# Patient Record
Sex: FEMALE | Race: WHITE | NOT HISPANIC OR LATINO | Employment: FULL TIME | URBAN - METROPOLITAN AREA
[De-identification: names, ages, dates, MRNs, and addresses within clinical notes are randomized per-mention and may not be internally consistent; named-entity substitution may affect disease eponyms.]

---

## 2017-01-09 ENCOUNTER — COMMUNICATION - HEALTHEAST (OUTPATIENT)
Dept: FAMILY MEDICINE | Facility: CLINIC | Age: 47
End: 2017-01-09

## 2017-01-09 DIAGNOSIS — J45.909 ASTHMA: ICD-10-CM

## 2017-01-12 ENCOUNTER — OFFICE VISIT (OUTPATIENT)
Dept: FAMILY MEDICINE | Facility: CLINIC | Age: 47
End: 2017-01-12

## 2017-01-12 VITALS
SYSTOLIC BLOOD PRESSURE: 132 MMHG | TEMPERATURE: 98.4 F | HEART RATE: 85 BPM | HEIGHT: 67 IN | BODY MASS INDEX: 41.47 KG/M2 | OXYGEN SATURATION: 96 % | WEIGHT: 264.2 LBS | RESPIRATION RATE: 20 BRPM | DIASTOLIC BLOOD PRESSURE: 85 MMHG

## 2017-01-12 DIAGNOSIS — J45.41 MODERATE PERSISTENT ASTHMA WITH ACUTE EXACERBATION: Primary | ICD-10-CM

## 2017-01-12 DIAGNOSIS — J20.9 ACUTE BRONCHITIS, UNSPECIFIED ORGANISM: ICD-10-CM

## 2017-01-12 RX ORDER — ALBUTEROL SULFATE 90 UG/1
2 AEROSOL, METERED RESPIRATORY (INHALATION) EVERY 6 HOURS PRN
Qty: 3 INHALER | Refills: 1 | Status: SHIPPED
Start: 2017-01-12 | End: 2021-08-24

## 2017-01-12 RX ORDER — PREDNISONE 20 MG/1
20 TABLET ORAL DAILY
Qty: 5 TABLET | Refills: 0 | Status: SHIPPED
Start: 2017-01-12 | End: 2021-08-31

## 2017-01-12 NOTE — PATIENT INSTRUCTIONS
Please take your prednisone.    If you develop fevers or worsening SOB without relief from neb/inhaler, please come back or go to ED.     Please come back in two weeks to reassess asthma control.       My Asthma Action Plan  Name: Annabella Guillen  YOB: 1970  Date: 1/12/2017   My doctor: Estelle Kearns   My clinic:   [facility] [address] [place] [address]  [phone]    My Asthma Severity: moderate persistent Avoid your asthma triggers: upper respiratory infections, pollens, animal dander and exercise or sports      GREEN ZONE   Good Control    I feel good    No cough or wheeze    Can work, sleep and play without asthma symptoms       Take your asthma control medicine every day.  Take the medications listed below daily.    Symbicort  160/4.5 mcg 2 puffs twice a day    1. If exercise triggers your asthma, take your rescue medication (2 puffs of albuterol, Ventolin/Pro-Air) 15 minutes before exercise or sports, and during exercise if you have asthma symptoms.  2. Spacer to use with inhaler: If you have a spacer, make sure to use it with your inhaler.              YELLOW ZONE Getting Worse  I have ANY of these:    I do not feel good    Cough or wheeze    Chest feels tight    Wake up at night   1. Keep taking your Green Zone medications.  2. Start taking your rescue medicine (1-2 puffs of albuterol - Ventolin/Pro-Air) every 4-6 hours as needed.  3. If symptoms are not controlled with above, can take 2 puffs every 20 minutes for up to 1 hour, then continue every 4 hours if needed.   4. If you do not return to the Green Zone in 12-24 hours or you get worse, call the clinic.         RED ZONE Medical Alert - Get Help  I have ANY of these:    I feel awful    Medicine is not helping    Breathing getting harder    Trouble walking or talking    Nose opens wide to breathe       1. Take your rescue medicine NOW (6-8 puffs of albuterol - Ventolin/Pro-Air) for every 20 minutes for up to  1 hour.  2. Call your doctor NOW.  3. If you are still in the Red Zone after 20 minutes and you have not reached your doctor:    Take your rescue medicine again (6-8 puffs of albuterol - Ventolin/Pro-Air) and    Call 911 or go to the emergency room right away    See your regular doctor within 1 weeks of an Emergency Room or Urgent Care visit for follow-up treatment.            Electronically signed by: Estelle Kearns    Annual Reminders:  Meet with Asthma Educator,  Flu Shot in the Fall, Pneumonia Shot  [id number] [place], [place] - [address] AT [facility] [address]

## 2017-01-12 NOTE — PROGRESS NOTES
"       HPI:       Annabella Guillen is a 46 year old  female with a significant past medical history of asthma who presents for follow up concern of URI.    Recently had sinus infection and given abx 12/28/16.   Finished abx Jan 4   Felt better for five days, not 100%- but face pain, ear pain better.    Then on Sat (1/7/17) she began to have sore throat, cough again   Food tastes strange-almost metallic, salt burns her throat.    Really thick, tan mucous phlegm   She has had a couple of episodes of post tussive emesis, as well as slight incontinence  Myalgias.   No fever. Slight short of breath, but relieved with nebs/inhaler.   ACT 19 today    Had abx for BV shortly before course of cefdinir- she is wondering if she has thrush.  Was on airplane and cruise ship. Also 5/9 family members on her trip had URI symptoms.         PMHX:     Patient Active Problem List   Diagnosis     Atopic rhinitis     Morbid obesity due to excess calories (H)     Asthma     Adjustment disorder with anxious mood       Current Outpatient Prescriptions   Medication Sig Dispense Refill     albuterol (ALBUTEROL) 108 (90 BASE) MCG/ACT Inhaler 2 puffs       budesonide-formoterol (SYMBICORT) 160-4.5 MCG/ACT Inhaler Inhale 2 puffs into the lungs       cetirizine HCl (ZYRTEC ALLERGY) 10 MG CAPS Take 10 mg by mouth daily       cefdinir (OMNICEF) 300 MG capsule Take 1 capsule (300 mg) by mouth daily 10 capsule 0     omeprazole (PRILOSEC) 40 MG capsule Take 40 mg by mouth daily          Allergies   Allergen Reactions     Amoxicillin-Pot Clavulanate      Augmentin     Penicillins Nausea and Vomiting            Review of Systems:   As above          Physical Exam:     Filed Vitals:    01/12/17 1109   BP: 132/85   Pulse: 85   Temp: 98.4  F (36.9  C)   TempSrc: Oral   Resp: 20   Height: 5' 6.5\" (168.9 cm)   Weight: 264 lb 3.2 oz (119.84 kg)   SpO2: 96%     Body mass index is 42.01 kg/(m^2).    Vitals reviewed and normal.  GENERAL APPEARANCE: healthy, alert " and no distress  EYES: Eyes grossly normal to inspection, PERRL and conjunctivae and sclerae normal  HENT: ear canals and TM's normal, nose and mouth without ulcers or lesions, oropharynx clear and oral mucous membranes moist. Tonsils wnl. PND evident. No thrush.   NECK: no adenopathy, no asymmetry, masses, or scars and thyroid normal to palpation  RESP: Diffuse wheezing. No crackles. No tachypnea or increased WOB.   CV: regular rates and rhythm, normal S1 S2, no S3 or S4, no murmur, click or rub, no peripheral edema and peripheral pulses 2+  PSYCH: mentation appears normal and affect normal/bright    Assessment and Plan     (J45.41) Moderate persistent asthma with acute exacerbation  (primary encounter diagnosis)- Asthma seems exacerbated by viral URI.   Plan: predniSONE (DELTASONE) 20 MG tablet x 5 days,   albuterol  108 (90 BASE) MCG/ACT Inhaler PRN,   Asthma Action Plan   RTC if not improved within one week.   RTC vs ER if fever or SOB develops.           Options for treatment and follow-up care were reviewed with the patient and/or guardian. Annabella Guillen and/or guardian engaged in the decision making process and verbalized understanding of the options discussed and agreed with the final plan.    Estelle Kearns MD      Precepted today with: Caryn Crowell MD

## 2017-01-12 NOTE — MR AVS SNAPSHOT
After Visit Summary   1/12/2017    Annabella Guillen    MRN: [id number]           Patient Information     Date Of Birth          1970        Visit Information        Provider Department      1/12/2017 11:00 AM Estelle Kearns MD [facility]        Today's Diagnoses     Moderate persistent asthma with acute exacerbation    -  1       Care Instructions    Please take your prednisone.    If you develop fevers or worsening SOB without relief from neb/inhaler, please come back or go to ED.     Please come back in two weeks to reassess asthma control.       My Asthma Action Plan  Name: Annabella Guillen  YOB: 1970  Date: 1/12/2017   My doctor: Estelle Kearns   My clinic:   [facility] [address] [place] [address]  [phone]    My Asthma Severity: moderate persistent Avoid your asthma triggers: upper respiratory infections, pollens, animal dander and exercise or sports      GREEN ZONE   Good Control    I feel good    No cough or wheeze    Can work, sleep and play without asthma symptoms       Take your asthma control medicine every day.  Take the medications listed below daily.    Symbicort  160/4.5 mcg 2 puffs twice a day    1. If exercise triggers your asthma, take your rescue medication (2 puffs of albuterol, Ventolin/Pro-Air) 15 minutes before exercise or sports, and during exercise if you have asthma symptoms.  2. Spacer to use with inhaler: If you have a spacer, make sure to use it with your inhaler.              YELLOW ZONE Getting Worse  I have ANY of these:    I do not feel good    Cough or wheeze    Chest feels tight    Wake up at night   1. Keep taking your Green Zone medications.  2. Start taking your rescue medicine (1-2 puffs of albuterol - Ventolin/Pro-Air) every 4-6 hours as needed.  3. If symptoms are not controlled with above, can take 2 puffs every 20 minutes for up to 1 hour, then continue every 4 hours if needed.   4. If you  do not return to the Green Zone in 12-24 hours or you get worse, call the clinic.         RED ZONE Medical Alert - Get Help  I have ANY of these:    I feel awful    Medicine is not helping    Breathing getting harder    Trouble walking or talking    Nose opens wide to breathe       1. Take your rescue medicine NOW (6-8 puffs of albuterol - Ventolin/Pro-Air) for every 20 minutes for up to 1 hour.  2. Call your doctor NOW.  3. If you are still in the Red Zone after 20 minutes and you have not reached your doctor:    Take your rescue medicine again (6-8 puffs of albuterol - Ventolin/Pro-Air) and    Call 911 or go to the emergency room right away    See your regular doctor within 1 weeks of an Emergency Room or Urgent Care visit for follow-up treatment.            Electronically signed by: Estelle Kearns    Annual Reminders:  Meet with Asthma Educator,  Flu Shot in the Fall, Pneumonia Shot  Pharmacy: [facility] [place]) [place] [address] AT [facility] OF [address]        Follow-ups after your visit        Who to contact     Please call your clinic at [phone] to:    Ask questions about your health    Make or cancel appointments    Discuss your medicines    Learn about your test results    Speak to your doctor   If you have compliments or concerns about an experience at your clinic, or if you wish to file a complaint, please contact [facility] Patient Relations at [phone] or email us at [email or web address]         Additional Information About Your Visit        Scope [employer] Information     [facility] is an electronic gateway that provides easy, online access to your medical records. With [facility], you can request a clinic appointment, read your test results, renew a prescription or communicate with your care team.     To sign up for [facility] visit the website at [email or web address]   You will be asked to enter the access code listed  "below, as well as some personal information. Please follow the directions to create your username and password.     Your access code is: [email or web address]  Expires: 3/28/2017 10:18 AM     Your access code will  in 90 days. If you need help or a new code, please contact your [facility] or call [phone] for assistance.        Care EveryWhere ID     This is your Care EveryWhere ID. This could be used by other organizations to access your [place] medical records  [id number]        Your Vitals Were     Pulse Temperature Respirations Height BMI (Body Mass Index) Pulse Oximetry    85 98.4  F (36.9  C) (Oral) 20 5' 6.5\" (168.9 cm) 42.01 kg/m2 96%       Blood Pressure from Last 3 Encounters:   17 132/85   16 136/82    Weight from Last 3 Encounters:   17 264 lb 3.2 oz (119.84 kg)   16 264 lb (119.75 kg)              We Performed the Following     Asthma Action Plan (Please complete AAP by clicking Edit below in Patient Instructions section.)          Today's Medication Changes          These changes are accurate as of: 17 11:47 AM.  If you have any questions, ask your nurse or doctor.               Start taking these medicines.        Dose/Directions    predniSONE 20 MG tablet   Commonly known as:  DELTASONE   Used for:  Moderate persistent asthma with acute exacerbation   Started by:  Estelle Kearns MD        Dose:  20 mg   Take 1 tablet (20 mg) by mouth daily   Quantity:  5 tablet   Refills:  0         These medicines have changed or have updated prescriptions.        Dose/Directions    * albuterol 108 (90 BASE) MCG/ACT Inhaler   This may have changed:  Another medication with the same name was added. Make sure you understand how and when to take each.   Generic drug:  albuterol   Changed by:  Estelle Kearns MD        Dose:  2 puff   2 puffs   Refills:  0       * albuterol 108 (90 BASE) MCG/ACT Inhaler   Commonly known as:  PROAIR HFA/PROVENTIL " HFA/VENTOLIN HFA   This may have changed:  You were already taking a medication with the same name, and this prescription was added. Make sure you understand how and when to take each.   Used for:  Moderate persistent asthma with acute exacerbation   Changed by:  Estelle Kearns MD        Dose:  2 puff   Inhale 2 puffs into the lungs every 6 hours as needed for shortness of breath / dyspnea or wheezing   Quantity:  3 Inhaler   Refills:  1       * Notice:  This list has 2 medication(s) that are the same as other medications prescribed for you. Read the directions carefully, and ask your doctor or other care provider to review them with you.         Where to get your medicines      These medications were sent to [facility] ([place]) - [place], [place] - [address] AT [address] [facility]  [address], [facility] [address]     Phone:  [phone]    - albuterol 108 (90 BASE) MCG/ACT Inhaler  - predniSONE 20 MG tablet             Primary Care Provider Office Phone # Fax #    Estelle Kearns [phone]       [facility] [address] [place] [address]        Thank you!     Thank you for choosing [facility]  for your care. Our goal is always to provide you with excellent care. Hearing back from our patients is one way we can continue to improve our services. Please take a few minutes to complete the written survey that you may receive in the mail after your visit with us. Thank you!             Your Updated Medication List - Protect others around you: Learn how to safely use, store and throw away your medicines at www.disposemymeds.org.          This list is accurate as of: 1/12/17 11:47 AM.  Always use your most recent med list.                   Brand Name Dispense Instructions for use    * albuterol 108 (90 BASE) MCG/ACT Inhaler   Generic drug:  albuterol      2 puffs       * albuterol 108 (90 BASE) MCG/ACT Inhaler     PROAIR HFA/PROVENTIL HFA/VENTOLIN HFA    3 Inhaler    Inhale 2 puffs into the lungs every 6 hours as needed for shortness of breath / dyspnea or wheezing       cefdinir 300 MG capsule    OMNICEF    10 capsule    Take 1 capsule (300 mg) by mouth daily       omeprazole 40 MG capsule    priLOSEC     Take 40 mg by mouth daily       predniSONE 20 MG tablet    DELTASONE    5 tablet    Take 1 tablet (20 mg) by mouth daily       SYMBICORT 160-4.5 MCG/ACT Inhaler   Generic drug:  budesonide-formoterol      Inhale 2 puffs into the lungs       ZYRTEC ALLERGY 10 MG Caps   Generic drug:  cetirizine HCl      Take 10 mg by mouth daily       * Notice:  This list has 2 medication(s) that are the same as other medications prescribed for you. Read the directions carefully, and ask your doctor or other care provider to review them with you.

## 2017-01-12 NOTE — PROGRESS NOTES
Preceptor Attestation:  Patient's case reviewed and discussed with Estelle Kearns MD resident and I evaluated the patient. I agree with written assessment and plan of care.  Supervising Physician:  Caryn Crowell MD  [facility]

## 2017-01-19 ENCOUNTER — RECORDS - HEALTHEAST (OUTPATIENT)
Dept: ADMINISTRATIVE | Facility: OTHER | Age: 47
End: 2017-01-19

## 2017-01-20 ENCOUNTER — RECORDS - HEALTHEAST (OUTPATIENT)
Dept: ADMINISTRATIVE | Facility: OTHER | Age: 47
End: 2017-01-20

## 2017-01-20 ASSESSMENT — ASTHMA QUESTIONNAIRES: ACT_TOTALSCORE: 12

## 2017-09-22 ENCOUNTER — TELEPHONE (OUTPATIENT)
Dept: FAMILY MEDICINE | Facility: CLINIC | Age: 47
End: 2017-09-22

## 2017-09-22 NOTE — TELEPHONE ENCOUNTER
Called but no answer. Left VM for pt to call back to go over ACT. If score below 19 will schedule asthma follow up. --Thang, CMA

## 2017-11-10 ENCOUNTER — OFFICE VISIT - HEALTHEAST (OUTPATIENT)
Dept: FAMILY MEDICINE | Facility: CLINIC | Age: 47
End: 2017-11-10

## 2017-11-10 DIAGNOSIS — Z12.31 ENCOUNTER FOR SCREENING MAMMOGRAM FOR BREAST CANCER: ICD-10-CM

## 2017-11-10 DIAGNOSIS — J45.909 ASTHMA: ICD-10-CM

## 2017-11-10 ASSESSMENT — MIFFLIN-ST. JEOR: SCORE: 1741.28

## 2017-12-11 ENCOUNTER — HOSPITAL ENCOUNTER (OUTPATIENT)
Dept: MAMMOGRAPHY | Facility: CLINIC | Age: 47
Discharge: HOME | End: 2017-12-11
Attending: FAMILY MEDICINE

## 2017-12-11 DIAGNOSIS — Z12.31 ENCOUNTER FOR SCREENING MAMMOGRAM FOR BREAST CANCER: ICD-10-CM

## 2017-12-15 ENCOUNTER — COMMUNICATION - HEALTHEAST (OUTPATIENT)
Dept: FAMILY MEDICINE | Facility: CLINIC | Age: 47
End: 2017-12-15

## 2017-12-15 ENCOUNTER — RECORDS - HEALTHEAST (OUTPATIENT)
Dept: ADMINISTRATIVE | Facility: OTHER | Age: 47
End: 2017-12-15

## 2017-12-30 ENCOUNTER — COMMUNICATION - HEALTHEAST (OUTPATIENT)
Dept: FAMILY MEDICINE | Facility: CLINIC | Age: 47
End: 2017-12-30

## 2018-01-07 ENCOUNTER — HEALTH MAINTENANCE LETTER (OUTPATIENT)
Age: 48
End: 2018-01-07

## 2018-04-06 ENCOUNTER — COMMUNICATION - HEALTHEAST (OUTPATIENT)
Dept: SCHEDULING | Facility: CLINIC | Age: 48
End: 2018-04-06

## 2018-04-09 ENCOUNTER — OFFICE VISIT - HEALTHEAST (OUTPATIENT)
Dept: CARDIOLOGY | Facility: CLINIC | Age: 48
End: 2018-04-09

## 2018-04-09 DIAGNOSIS — R07.9 CHEST PAIN, UNSPECIFIED TYPE: ICD-10-CM

## 2018-04-09 DIAGNOSIS — R00.2 PALPITATIONS: ICD-10-CM

## 2018-04-09 DIAGNOSIS — R55 PRE-SYNCOPE: ICD-10-CM

## 2018-04-09 ASSESSMENT — MIFFLIN-ST. JEOR: SCORE: 1762.6

## 2018-07-24 ENCOUNTER — OFFICE VISIT - HEALTHEAST (OUTPATIENT)
Dept: FAMILY MEDICINE | Facility: CLINIC | Age: 48
End: 2018-07-24

## 2018-07-24 DIAGNOSIS — J01.90 ACUTE SINUSITIS: ICD-10-CM

## 2018-07-24 ASSESSMENT — MIFFLIN-ST. JEOR: SCORE: 1807.96

## 2018-09-05 ENCOUNTER — COMMUNICATION - HEALTHEAST (OUTPATIENT)
Dept: FAMILY MEDICINE | Facility: CLINIC | Age: 48
End: 2018-09-05

## 2018-11-23 ENCOUNTER — COMMUNICATION - HEALTHEAST (OUTPATIENT)
Dept: FAMILY MEDICINE | Facility: CLINIC | Age: 48
End: 2018-11-23

## 2018-11-23 DIAGNOSIS — J45.909 ASTHMA: ICD-10-CM

## 2018-12-05 ENCOUNTER — COMMUNICATION - HEALTHEAST (OUTPATIENT)
Dept: FAMILY MEDICINE | Facility: CLINIC | Age: 48
End: 2018-12-05

## 2019-07-31 ENCOUNTER — SURGERY - HEALTHEAST (OUTPATIENT)
Dept: PODIATRY | Facility: CLINIC | Age: 49
End: 2019-07-31

## 2019-07-31 ENCOUNTER — RECORDS - HEALTHEAST (OUTPATIENT)
Dept: GENERAL RADIOLOGY | Facility: CLINIC | Age: 49
End: 2019-07-31

## 2019-07-31 ENCOUNTER — OFFICE VISIT - HEALTHEAST (OUTPATIENT)
Dept: PODIATRY | Facility: CLINIC | Age: 49
End: 2019-07-31

## 2019-07-31 DIAGNOSIS — M20.10 HALLUX VALGUS, UNSPECIFIED LATERALITY: ICD-10-CM

## 2019-07-31 DIAGNOSIS — M20.10 HALLUX VALGUS (ACQUIRED), UNSPECIFIED FOOT: ICD-10-CM

## 2019-07-31 DIAGNOSIS — M21.6X2 PRONATION DEFORMITY OF BOTH FEET: ICD-10-CM

## 2019-07-31 DIAGNOSIS — M21.6X1 PRONATION DEFORMITY OF BOTH FEET: ICD-10-CM

## 2019-07-31 ASSESSMENT — MIFFLIN-ST. JEOR: SCORE: 1807.96

## 2019-08-02 ENCOUNTER — COMMUNICATION - HEALTHEAST (OUTPATIENT)
Dept: VASCULAR SURGERY | Facility: CLINIC | Age: 49
End: 2019-08-02

## 2019-08-08 ENCOUNTER — COMMUNICATION - HEALTHEAST (OUTPATIENT)
Dept: OTHER | Facility: CLINIC | Age: 49
End: 2019-08-08

## 2019-08-13 ENCOUNTER — COMMUNICATION - HEALTHEAST (OUTPATIENT)
Dept: OTHER | Facility: CLINIC | Age: 49
End: 2019-08-13

## 2019-10-27 ENCOUNTER — COMMUNICATION - HEALTHEAST (OUTPATIENT)
Dept: FAMILY MEDICINE | Facility: CLINIC | Age: 49
End: 2019-10-27

## 2019-10-27 DIAGNOSIS — J45.909 ASTHMA: ICD-10-CM

## 2020-01-04 ENCOUNTER — COMMUNICATION - HEALTHEAST (OUTPATIENT)
Dept: FAMILY MEDICINE | Facility: CLINIC | Age: 50
End: 2020-01-04

## 2020-01-04 DIAGNOSIS — J45.909 ASTHMA: ICD-10-CM

## 2020-03-13 ENCOUNTER — COMMUNICATION - HEALTHEAST (OUTPATIENT)
Dept: FAMILY MEDICINE | Facility: CLINIC | Age: 50
End: 2020-03-13

## 2020-03-13 DIAGNOSIS — J45.30 MILD PERSISTENT ASTHMA WITHOUT COMPLICATION: ICD-10-CM

## 2020-03-18 ENCOUNTER — OFFICE VISIT - HEALTHEAST (OUTPATIENT)
Dept: FAMILY MEDICINE | Facility: CLINIC | Age: 50
End: 2020-03-18

## 2020-03-18 DIAGNOSIS — J45.909 ASTHMA: ICD-10-CM

## 2020-03-18 DIAGNOSIS — J30.9 ALLERGIC RHINITIS, UNSPECIFIED SEASONALITY, UNSPECIFIED TRIGGER: ICD-10-CM

## 2020-04-03 ENCOUNTER — COMMUNICATION - HEALTHEAST (OUTPATIENT)
Dept: FAMILY MEDICINE | Facility: CLINIC | Age: 50
End: 2020-04-03

## 2020-04-16 ENCOUNTER — COMMUNICATION - HEALTHEAST (OUTPATIENT)
Dept: FAMILY MEDICINE | Facility: CLINIC | Age: 50
End: 2020-04-16

## 2020-05-05 ENCOUNTER — COMMUNICATION - HEALTHEAST (OUTPATIENT)
Dept: FAMILY MEDICINE | Facility: CLINIC | Age: 50
End: 2020-05-05

## 2020-06-11 ENCOUNTER — COMMUNICATION - HEALTHEAST (OUTPATIENT)
Dept: FAMILY MEDICINE | Facility: CLINIC | Age: 50
End: 2020-06-11

## 2020-06-11 DIAGNOSIS — J45.30 MILD PERSISTENT ASTHMA WITHOUT COMPLICATION: ICD-10-CM

## 2020-07-15 ENCOUNTER — COMMUNICATION - HEALTHEAST (OUTPATIENT)
Dept: FAMILY MEDICINE | Facility: CLINIC | Age: 50
End: 2020-07-15

## 2020-07-16 ENCOUNTER — OFFICE VISIT - HEALTHEAST (OUTPATIENT)
Dept: FAMILY MEDICINE | Facility: CLINIC | Age: 50
End: 2020-07-16

## 2020-07-16 ENCOUNTER — COMMUNICATION - HEALTHEAST (OUTPATIENT)
Dept: FAMILY MEDICINE | Facility: CLINIC | Age: 50
End: 2020-07-16

## 2020-07-16 DIAGNOSIS — Z12.31 ENCOUNTER FOR SCREENING MAMMOGRAM FOR BREAST CANCER: ICD-10-CM

## 2020-07-16 DIAGNOSIS — J45.909 ASTHMA: ICD-10-CM

## 2020-07-16 DIAGNOSIS — E66.01 MORBID OBESITY (H): ICD-10-CM

## 2020-07-16 DIAGNOSIS — J45.30 MILD PERSISTENT ASTHMA WITHOUT COMPLICATION: ICD-10-CM

## 2020-07-16 DIAGNOSIS — Z00.00 ROUTINE GENERAL MEDICAL EXAMINATION AT A HEALTH CARE FACILITY: ICD-10-CM

## 2020-07-16 LAB
ALBUMIN SERPL-MCNC: 4 G/DL (ref 3.5–5)
ALP SERPL-CCNC: 91 U/L (ref 45–120)
ALT SERPL W P-5'-P-CCNC: 43 U/L (ref 0–45)
ANION GAP SERPL CALCULATED.3IONS-SCNC: 10 MMOL/L (ref 5–18)
AST SERPL W P-5'-P-CCNC: 30 U/L (ref 0–40)
BILIRUB SERPL-MCNC: 0.7 MG/DL (ref 0–1)
BUN SERPL-MCNC: 15 MG/DL (ref 8–22)
CALCIUM SERPL-MCNC: 9.8 MG/DL (ref 8.5–10.5)
CHLORIDE BLD-SCNC: 104 MMOL/L (ref 98–107)
CHOLEST SERPL-MCNC: 207 MG/DL
CO2 SERPL-SCNC: 26 MMOL/L (ref 22–31)
CREAT SERPL-MCNC: 0.76 MG/DL (ref 0.6–1.1)
FASTING STATUS PATIENT QL REPORTED: YES
GFR SERPL CREATININE-BSD FRML MDRD: >60 ML/MIN/1.73M2
GLUCOSE BLD-MCNC: 106 MG/DL (ref 70–125)
HBA1C MFR BLD: 5.6 % (ref 3.5–6)
HDLC SERPL-MCNC: 58 MG/DL
LDLC SERPL CALC-MCNC: 137 MG/DL
POTASSIUM BLD-SCNC: 4.6 MMOL/L (ref 3.5–5)
PROT SERPL-MCNC: 7.1 G/DL (ref 6–8)
SODIUM SERPL-SCNC: 140 MMOL/L (ref 136–145)
TRIGL SERPL-MCNC: 61 MG/DL
TSH SERPL DL<=0.005 MIU/L-ACNC: 2.85 UIU/ML (ref 0.3–5)

## 2020-07-16 ASSESSMENT — MIFFLIN-ST. JEOR: SCORE: 1822.93

## 2020-07-17 LAB
HPV SOURCE: NORMAL
HUMAN PAPILLOMA VIRUS 16 DNA: NEGATIVE
HUMAN PAPILLOMA VIRUS 18 DNA: NEGATIVE
HUMAN PAPILLOMA VIRUS FINAL DIAGNOSIS: NORMAL
HUMAN PAPILLOMA VIRUS OTHER HR: NEGATIVE
SPECIMEN DESCRIPTION: NORMAL

## 2020-07-23 LAB
BKR LAB AP ABNORMAL BLEEDING: NO
BKR LAB AP BIRTH CONTROL/HORMONES: NORMAL
BKR LAB AP CERVICAL APPEARANCE: NORMAL
BKR LAB AP GYN ADEQUACY: NORMAL
BKR LAB AP GYN INTERPRETATION: NORMAL
BKR LAB AP HPV REFLEX: NORMAL
BKR LAB AP LMP: NORMAL
BKR LAB AP PATIENT STATUS: NORMAL
BKR LAB AP PREVIOUS ABNORMAL: NORMAL
BKR LAB AP PREVIOUS NORMAL: 2014
HIGH RISK?: NO
PATH REPORT.COMMENTS IMP SPEC: NORMAL
RESULT FLAG (HE HISTORICAL CONVERSION): NORMAL

## 2020-07-24 ENCOUNTER — COMMUNICATION - HEALTHEAST (OUTPATIENT)
Dept: FAMILY MEDICINE | Facility: CLINIC | Age: 50
End: 2020-07-24

## 2020-07-27 ENCOUNTER — COMMUNICATION - HEALTHEAST (OUTPATIENT)
Dept: SURGERY | Facility: CLINIC | Age: 50
End: 2020-07-27

## 2020-08-03 ENCOUNTER — OFFICE VISIT - HEALTHEAST (OUTPATIENT)
Dept: SURGERY | Facility: CLINIC | Age: 50
End: 2020-08-03

## 2020-08-03 DIAGNOSIS — E66.9 OBESITY: ICD-10-CM

## 2020-08-17 ENCOUNTER — OFFICE VISIT - HEALTHEAST (OUTPATIENT)
Dept: SURGERY | Facility: CLINIC | Age: 50
End: 2020-08-17

## 2020-08-17 DIAGNOSIS — E66.9 OBESITY: ICD-10-CM

## 2020-09-09 ENCOUNTER — OFFICE VISIT - HEALTHEAST (OUTPATIENT)
Dept: FAMILY MEDICINE | Facility: CLINIC | Age: 50
End: 2020-09-09

## 2020-09-09 ENCOUNTER — HOSPITAL ENCOUNTER (OUTPATIENT)
Dept: MAMMOGRAPHY | Facility: CLINIC | Age: 50
Discharge: HOME | End: 2020-09-09
Attending: FAMILY MEDICINE

## 2020-09-09 DIAGNOSIS — J45.30 MILD PERSISTENT ASTHMA WITHOUT COMPLICATION: ICD-10-CM

## 2020-09-09 DIAGNOSIS — S86.811A STRAIN OF CALF MUSCLE, RIGHT, INITIAL ENCOUNTER: ICD-10-CM

## 2020-09-09 DIAGNOSIS — Z23 NEED FOR VACCINATION: ICD-10-CM

## 2020-09-09 DIAGNOSIS — Z12.31 ENCOUNTER FOR SCREENING MAMMOGRAM FOR BREAST CANCER: ICD-10-CM

## 2020-09-09 ASSESSMENT — MIFFLIN-ST. JEOR: SCORE: 1848.79

## 2020-09-18 ENCOUNTER — HOSPITAL ENCOUNTER (OUTPATIENT)
Dept: ULTRASOUND IMAGING | Facility: CLINIC | Age: 50
Discharge: HOME | End: 2020-09-18
Attending: FAMILY MEDICINE

## 2020-09-18 ENCOUNTER — HOSPITAL ENCOUNTER (OUTPATIENT)
Dept: MAMMOGRAPHY | Facility: CLINIC | Age: 50
Discharge: HOME | End: 2020-09-18
Attending: FAMILY MEDICINE

## 2020-09-18 DIAGNOSIS — N64.89 BREAST ASYMMETRY: ICD-10-CM

## 2020-09-21 ENCOUNTER — OFFICE VISIT - HEALTHEAST (OUTPATIENT)
Dept: FAMILY MEDICINE | Facility: CLINIC | Age: 50
End: 2020-09-21

## 2020-09-21 DIAGNOSIS — J45.41 MODERATE PERSISTENT ASTHMA WITH ACUTE EXACERBATION: ICD-10-CM

## 2020-09-28 ENCOUNTER — COMMUNICATION - HEALTHEAST (OUTPATIENT)
Dept: FAMILY MEDICINE | Facility: CLINIC | Age: 50
End: 2020-09-28

## 2020-09-29 ENCOUNTER — OFFICE VISIT - HEALTHEAST (OUTPATIENT)
Dept: FAMILY MEDICINE | Facility: CLINIC | Age: 50
End: 2020-09-29

## 2020-09-29 DIAGNOSIS — B02.9 HERPES ZOSTER WITHOUT COMPLICATION: ICD-10-CM

## 2020-09-29 DIAGNOSIS — Z20.822 EXPOSURE TO COVID-19 VIRUS: ICD-10-CM

## 2020-10-27 ENCOUNTER — COMMUNICATION - HEALTHEAST (OUTPATIENT)
Dept: FAMILY MEDICINE | Facility: CLINIC | Age: 50
End: 2020-10-27

## 2020-10-27 DIAGNOSIS — J45.909 ASTHMA: ICD-10-CM

## 2020-12-04 ENCOUNTER — COMMUNICATION - HEALTHEAST (OUTPATIENT)
Dept: FAMILY MEDICINE | Facility: CLINIC | Age: 50
End: 2020-12-04

## 2020-12-04 DIAGNOSIS — J45.30 MILD PERSISTENT ASTHMA WITHOUT COMPLICATION: ICD-10-CM

## 2020-12-08 ENCOUNTER — COMMUNICATION - HEALTHEAST (OUTPATIENT)
Dept: FAMILY MEDICINE | Facility: CLINIC | Age: 50
End: 2020-12-08

## 2021-01-01 ENCOUNTER — COMMUNICATION - HEALTHEAST (OUTPATIENT)
Dept: FAMILY MEDICINE | Facility: CLINIC | Age: 51
End: 2021-01-01

## 2021-01-04 ENCOUNTER — COMMUNICATION - HEALTHEAST (OUTPATIENT)
Dept: FAMILY MEDICINE | Facility: CLINIC | Age: 51
End: 2021-01-04

## 2021-01-11 ENCOUNTER — OFFICE VISIT - HEALTHEAST (OUTPATIENT)
Dept: FAMILY MEDICINE | Facility: CLINIC | Age: 51
End: 2021-01-11

## 2021-01-11 DIAGNOSIS — R06.83 SNORING: ICD-10-CM

## 2021-01-11 DIAGNOSIS — F41.1 GAD (GENERALIZED ANXIETY DISORDER): ICD-10-CM

## 2021-01-11 DIAGNOSIS — E66.01 MORBID OBESITY (H): ICD-10-CM

## 2021-01-11 ASSESSMENT — MIFFLIN-ST. JEOR: SCORE: 1909.57

## 2021-01-26 ENCOUNTER — COMMUNICATION - HEALTHEAST (OUTPATIENT)
Dept: FAMILY MEDICINE | Facility: CLINIC | Age: 51
End: 2021-01-26

## 2021-02-10 ENCOUNTER — OFFICE VISIT - HEALTHEAST (OUTPATIENT)
Dept: FAMILY MEDICINE | Facility: CLINIC | Age: 51
End: 2021-02-10

## 2021-02-10 DIAGNOSIS — E66.01 MORBID OBESITY (H): ICD-10-CM

## 2021-02-10 DIAGNOSIS — F43.9 STRESS: ICD-10-CM

## 2021-02-10 DIAGNOSIS — F41.1 GAD (GENERALIZED ANXIETY DISORDER): ICD-10-CM

## 2021-03-11 ENCOUNTER — COMMUNICATION - HEALTHEAST (OUTPATIENT)
Dept: FAMILY MEDICINE | Facility: CLINIC | Age: 51
End: 2021-03-11

## 2021-03-11 ENCOUNTER — OFFICE VISIT - HEALTHEAST (OUTPATIENT)
Dept: FAMILY MEDICINE | Facility: CLINIC | Age: 51
End: 2021-03-11

## 2021-03-11 DIAGNOSIS — I10 HYPERTENSION, UNSPECIFIED TYPE: ICD-10-CM

## 2021-03-11 DIAGNOSIS — R60.0 PERIPHERAL EDEMA: ICD-10-CM

## 2021-03-11 LAB
ALBUMIN SERPL-MCNC: 4.1 G/DL (ref 3.5–5)
ALP SERPL-CCNC: 99 U/L (ref 45–120)
ALT SERPL W P-5'-P-CCNC: 45 U/L (ref 0–45)
ANION GAP SERPL CALCULATED.3IONS-SCNC: 10 MMOL/L (ref 5–18)
AST SERPL W P-5'-P-CCNC: 29 U/L (ref 0–40)
BILIRUB SERPL-MCNC: 0.4 MG/DL (ref 0–1)
BNP SERPL-MCNC: 18 PG/ML (ref 0–69)
BUN SERPL-MCNC: 13 MG/DL (ref 8–22)
CALCIUM SERPL-MCNC: 9.6 MG/DL (ref 8.5–10.5)
CHLORIDE BLD-SCNC: 105 MMOL/L (ref 98–107)
CO2 SERPL-SCNC: 27 MMOL/L (ref 22–31)
CREAT SERPL-MCNC: 0.75 MG/DL (ref 0.6–1.1)
GFR SERPL CREATININE-BSD FRML MDRD: >60 ML/MIN/1.73M2
GLUCOSE BLD-MCNC: 93 MG/DL (ref 70–125)
POTASSIUM BLD-SCNC: 4.1 MMOL/L (ref 3.5–5)
PROT SERPL-MCNC: 7.6 G/DL (ref 6–8)
SODIUM SERPL-SCNC: 142 MMOL/L (ref 136–145)

## 2021-03-29 ENCOUNTER — COMMUNICATION - HEALTHEAST (OUTPATIENT)
Dept: FAMILY MEDICINE | Facility: CLINIC | Age: 51
End: 2021-03-29

## 2021-03-29 DIAGNOSIS — J45.909 ASTHMA: ICD-10-CM

## 2021-05-03 ENCOUNTER — OFFICE VISIT - HEALTHEAST (OUTPATIENT)
Dept: FAMILY MEDICINE | Facility: CLINIC | Age: 51
End: 2021-05-03

## 2021-05-03 DIAGNOSIS — I10 ESSENTIAL HYPERTENSION, BENIGN: ICD-10-CM

## 2021-05-28 ASSESSMENT — ASTHMA QUESTIONNAIRES
ACT_TOTALSCORE: 13
ACT_TOTALSCORE: 20
ACT_TOTALSCORE: 19
ACT_TOTALSCORE: 20

## 2021-05-31 VITALS — WEIGHT: 237.8 LBS | BODY MASS INDEX: 37.32 KG/M2 | HEIGHT: 67 IN

## 2021-05-31 NOTE — PATIENT INSTRUCTIONS - HE
Surgery Information    Surgery Date Naoim will call you     ( Arrive at the hospital one and a half hours prior to your surgery and 1 hour prior at the surgery center. Check in at the . The only exception is if you are scheduled for surgery at 7am, you should arrive at 5:30am) The nurse will call you a couple of day before surgery go with the time the nurse tells you.  All surgery times are estimated please go with what the nurse tells you when she calls.  Emergency's do happen and surgery times do get changed the nurse will let you know.  We give you the best estimate of time that we can at the time.      IF YOU NEED TO RESCHEDULE OR CANCEL YOUR SURGERY FOR ANY REASON PLEASE CALL THE CLINIC AS SOON AS POSSIBLE [phone].    Admission Type: Outpatient    Surgeon: Dr. Faye    Surgery Procedure:  modified bunionectomy first metatarsal osteotomy with internal screw fixation left foot Excision Ivey's Neuroma    Surgery Location: [facility],[address] [place] [address], FAX [phone]    Additional Information: If you use a CPAP machine for sleep apnea please bring it with you for surgery. We will want to monitor your breathing using your normal equipment.     HOSPITAL AND SURGERY CENTER INFORMATION    We need to know a lot of information about you before surgery.     1-5 Days Before:     A nurse will call you for a pre-screening interview. The phone call with the nurse will be much faster and easier if you  Have organized your information prior to the call.   This is when you will be told when to show up and what to bring.    Please have the following information available:    Preoperative Exam completed and faxed to our office  has to be within 30 days will not except 31 days.    Primary care provider's and any specialty providers' contact information available. .    If requested by your primary care provider, have any heart and lung exams at least 3 days before surgery.    Have a  complete and accurate list of medications available.     Have a list of your allergies/sensitivities and reactions    Know your health history, surgical history, and medical problems    Know any anesthesia issues with you or within your family.     BE SURE TO NOTIFY US IF YOU ARE TAKING ANY BLOOD THINNING AGENTS: Coumadin, Plavix, Aspirin, Xarelto, Eliquis    Someone plan to bring you and stay with you after the surgery for 24 hours    Day Before Surgery    1. STOP Smoking and Drinking: It is important to stop smoking and drinking at least 24 hours before surgery.  Smoking and drinking may cause complications in your recovery from anesthesia and may lengthen the healing process.    Please bring with you the day of surgery      List of medication    Eyeglass case or contact case with solution. You cannot wear contacts during surgery    Copy of Health Care Directives, Living Will or Power of     Insurance Cards    Photo ID    3. NOTHING BY MOUTH 8 HOURS BEFORE. This includes gum, hard candy, water and mints. The only exception is if you have been instructed by your doctor to take your medications with sips of water. You may rinse your mouth or brush your teeth, but do not swallow water.     4. Remove Nail Polish on fingers as well as toes  Day of Surgery    1. Medications- Take as indicated with sips of water     2. Wear comfortable loose fitting clothes. Wear your glasses-Not contacts. Do not wear jewelry and remove body piercing's. Surgery may be cancelled if they are not removed.     3. If same day surgery-Have a someone come with you to surgery that can help you understand the surgeon's instructions, drive you home and stay with you overnight the first night.     4. A nurse will call you at home within 72 hours following surgery to see how you are doing. Our nurses and doctors will discuss recovery with you.   POST-OPERATIVE CARE INSTRUCTIONS    After surgery  You will have swelling in the foot, and pain  "from the incision. The swelling is related to the increased blood flow to the foot in response to the \"surgical injury\" as well as the decreased capacity of blood to return to the heart due to less lower leg muscle contractions (which have the effect of increasing venous blood return). Swelling is often directly proportional to the pain. The greatest swelling occurs in the first 3-4 days after surgery . After that the swelling gradually diminishes. However, it often takes many months (or even a year for major surgery) for all of the swelling to resolve. The pain associated with swelling can vary widely. If it is not managed appropriately it can be very uncomfortable and frustrating.      1. Following surgery, go directly home and elevate your foot. Elevate your foot about 6 inches about the level of you hip by placing pillows under your foot and leg. Do not sit with your foot down, dangling or with your feet crossed.      2. Rest as often as possible with your foot elevated to reduce the occurrence of swelling and associated pain. Reserve walking for using the restroom and getting food/beverages.     3. Place ice over foot/ ankle area or behind the knee (if casted) for 20 minutes of each hour for 24 hours. Ice should NEVER be used when the foot is numb from a nerve block as this can easily lead to frostbite.     4. Use pain medications as prescribed with food. Once home, take pain medication and do the same at supper time and bedtime. The pain medication and ice should help to \"control\" your pain, however, it may NOT take away all of the pain.      Take or ask for pain relief medication when pain begins. It is easier to prevent or relieve pain before it becomes too bad.     Some activities may make pain worse. Take your pain medicine first.     Your doctor and nurse use a 0-10 pain rating scale to report your pain. 0= no pain and 10 = the worst possible pain. Reporting a number helps us to understand how well your " pain control plan is working and if your pain control plan needs changes.      5. Keep your bandages clean and dry while the incision heals. A small amount of bleeding is normal. DO NOT CHANGE THE DRESSING! If your bandages become damp call our office immediately. Options to keep this area dry when bathing include:      SPONGE BATHS    PLASTIC BAG WITH TAPE OR CAST BAG     SHOWER CHAIR OR HANDHELD SHOWER HEAD    DANGLING YOUR LEG/FOOT OVER THE EDGE OF THE TUB     6. Exercise your legs frequently by bending your knees to stimulate circulation and help aid in healing.     7. If given a post operative shoe or cast boot wear at all times when walking. You may remove the post-operative shoe or cast boot at bedtime. If the cast boot becomes too heavy or painful it may be removed only while the foot is elevated and not bearing weight.      8. DO NOT operate heavy equipment, drive a vehicle for 24 hours after surgery and while taking pain medications. You may resume driving when you feel you can do so safely.     CALL IMMEDIATELY IF:    THE BANDAGES ARE SOAKED FROM BLOOD, DRAINAGE OR WATER    YOUR MEDICATION DOES NOT MANAGE THE DISCOMFORT    YOU INJURE YOUR FOOT    YOU DEVELOP A FEVER    THE BANDAGES BECOME TOO TIGHT OR YOUR TOES BECOME NUMB (after 24 hours you may cut your bandage 1 inch from the toe area and ankle area this will relieve pain)    PLEASE CALL THE [facility] PODIATRY LINE [phone] WITH ANY QUESTION OR CONCERNS. IF CALLING AFTER REGULAR BUSINESS HOURS THE PHYSICIAN ON CALL WILL BE PAGED TO RETURN YOUR CALL.    The doctor will need to see you for 1-3 post operative appointments depending on your surgery and recovery. Please ensure these are scheduled before your surgery or immediately after surgery.

## 2021-05-31 NOTE — PROGRESS NOTES
FOOT AND ANKLE SURGERY/PODIATRY CONSULT NOTE         ASSESSMENT:   Hallux abductovalgus left foot  Pronation deformity both feet      TREATMENT:  I have recommended a modified bunionectomy first metatarsal osteotomy with internal screw fixation left foot.  I have also recommended orthotics.       HPI: The patient presented to the clinic today planing of a painful bunion left foot.  The patient stated that the pain is getting much worse.  She is finding it difficult to wear shoes without pain.  The big toe joint is aggravated with prolonged weightbearing and ambulation.  It is an aching type pain which is only relieved with nonweightbearing.  She has not had any redness or swelling surrounding the big toe left foot.  The patient also stated that her left foot flattens out quite a bit with weightbearing.  She denies any other previous treatment.     Past Medical History:   Diagnosis Date     Asthma      Syncope 2005    etiology unknown       Past Surgical History:   Procedure Laterality Date     [place] REDUCTION OF LARGE BREAST      Description: Breast Surgery Reduction Procedure;  Recorded: 02/18/2014;     AK REMOVAL OF TONSILS,<13 Y/O      Description: Tonsillectomy;  Recorded: 02/18/2014;     REDUCTION MAMMAPLASTY Bilateral 2000       Allergies   Allergen Reactions     Amoxicillin-Pot Clavulanate Unknown     Augmentin     Penicillins Nausea And Vomiting         Current Outpatient Medications:      albuterol (VENTOLIN HFA) 90 mcg/actuation inhaler, INHALE 2 PUFFS BY MOUTH EVERY 6 HOURS AS NEEDED FOR WHEEZING., Disp: 18 g, Rfl: 2     cetirizine (ZYRTEC) 10 mg cap, Take by mouth., Disp: , Rfl:      omeprazole (PRILOSEC) 40 MG capsule, TAKE 1 CAPSULE(40 MG) BY MOUTH DAILY, Disp: 90 capsule, Rfl: 3     SYMBICORT 160-4.5 mcg/actuation inhaler, INHALE 2 PUFFS BY MOUTH TWICE DAILY, Disp: 1 Inhaler, Rfl: 5    Family History   Problem Relation Age of Onset     Asthma Mother      Atrial fibrillation Mother      Asthma Son       Ovarian cancer Sister      Endometrial cancer Sister      Hypertension Father        Social History     Socioeconomic History     Marital status:      Spouse name: Not on file     Number of children: Not on file     Years of education: Not on file     Highest education level: Not on file   Occupational History     Not on file   Social Needs     Financial resource strain: Not on file     Food insecurity:     Worry: Not on file     Inability: Not on file     Transportation needs:     Medical: Not on file     Non-medical: Not on file   Tobacco Use     Smoking status: Former Smoker     Packs/day: 0.25     Years: 10.00     Pack years: 2.50     Types: Cigarettes     Last attempt to quit: 2011     Years since quittin.5     Smokeless tobacco: Never Used   Substance and Sexual Activity     Alcohol use: Yes     Comment: rare     Drug use: No     Sexual activity: Not on file   Lifestyle     Physical activity:     Days per week: Not on file     Minutes per session: Not on file     Stress: Not on file   Relationships     Social connections:     Talks on phone: Not on file     Gets together: Not on file     Attends Mu-ism service: Not on file     Active member of club or organization: Not on file     Attends meetings of clubs or organizations: Not on file     Relationship status: Not on file     Intimate partner violence:     Fear of current or ex partner: Not on file     Emotionally abused: Not on file     Physically abused: Not on file     Forced sexual activity: Not on file   Other Topics Concern     Not on file   Social History Narrative     Not on file       Review of Systems - Patient denies fever, chills, rash, wound, stiffness, limping, numbness, weakness, heart burn, blood in stool, chest pain with activity, calf pain when walking, shortness of breath with activity, chronic cough, easy bleeding/bruising, swelling of ankles, excessive thirst, fatigue, depression, anxiety.  Patient admits to painful  bunion left foot.  Flat feet..      OBJECTIVE:  Appearance: alert, well appearing, and in no distress.    There were no vitals filed for this visit.    BMI= There is no height or weight on file to calculate BMI.    General appearance: Patient is alert and fully cooperative with history & exam.  No sign of distress is noted during the visit.  Psychiatric: Affect is pleasant & appropriate.  Patient appears motivated to improve health.  Respiratory: Breathing is regular & unlabored while sitting.  HEENT: Hearing is intact to spoken word.  Speech is clear.  No gross evidence of visual impairment that would impact ambulation.    Vascular: Dorsalis pedis and posterior tibial pulses are palpable. There is pedal hair growth bilaterally.  CFT < 3 sec from anterior tibial surface to distal digits bilaterally. There is no appreciable edema noted.  Dermatologic: Turgor and texture are within normal limits. No coloration or temperature changes. No primary or secondary lesions noted.  Neurologic: All epicritic and proprioceptive sensations are grossly intact bilaterally.  Musculoskeletal: All active and passive ankle, subtalar, midtarsal range of motion are grossly intact without pain or crepitus, with the exception of first metatarsal phalangeal joint left foot. Manual muscle strength is within normal limits bilaterally. All dorsiflexors, plantarflexors, invertors, evertors are intact bilaterally. Tenderness present to first metatarsal phalangeal joint left foot on palpation. Tenderness to first metatarsal phalangeal joint left foot with range of motion.  There is a raised firm palpable subcutaneous mass on the medial aspect of the head of the first metatarsal left foot.  Calf is soft/non-tender without warmth/induration    Imaging:     No results found.       TREATMENT:  An x-ray of the left foot was taken today.  I informed the patient she would require surgical correction of her painful bunion.  I have recommended a   bunionectomy with first metatarsal osteotomy with internal screw fixation of the left foot.  The patient was told that this procedure is performed on an outpatient basis under local anesthesia with IV sedation.  She was told the procedure takes approximately 20 minutes to perform.  She would then be discharged and able to weight-bear in a postoperative shoe for 4 weeks.  The patient was told she would have to go n.p.o. at midnight prior to the procedure and she would have to see her primary care physician for preoperative consultation.  The patient was given a written list of potential postoperative complications with the procedure.  I also mention the patient will require orthotics to control her severe pronation.    Azael Faye; MILLER  [facility]Podiatry

## 2021-05-31 NOTE — TELEPHONE ENCOUNTER
Called patient to schedule surgery with DR Faye patient will call back needs to coordinate around work schedule

## 2021-06-01 VITALS — WEIGHT: 249 LBS | HEIGHT: 68 IN | BODY MASS INDEX: 37.74 KG/M2

## 2021-06-01 VITALS — WEIGHT: 239 LBS | BODY MASS INDEX: 36.22 KG/M2 | HEIGHT: 68 IN

## 2021-06-02 NOTE — TELEPHONE ENCOUNTER
RN cannot approve Refill Request    RN can NOT refill this medication PCP messaged that patient is overdue for Office Visit. Last office visit: 7/24/2018 Danica Liang MD Last Physical: Visit date not found Last MTM visit: Visit date not found Last visit same specialty: 7/24/2018 Danica Liang MD.  Next visit within 3 mo: Visit date not found  Next physical within 3 mo: Visit date not found      Gudelia Ramos, [facility] Triage/Med Refill 10/27/2019    Requested Prescriptions   Pending Prescriptions Disp Refills     albuterol (PROAIR HFA;PROVENTIL HFA;VENTOLIN HFA) 90 mcg/actuation inhaler [Pharmacy Med Name: ALBUTEROL HFA INH (200 PUFFS) 18GM] 18 g 0     Sig: INHALE 2 PUFFS BY MOUTH EVERY 6 HOURS AS NEEDED FOR WHEEZING       Albuterol/Levalbuterol Refill Protocol Failed - 10/27/2019  2:56 PM        Failed - PCP or prescribing provider visit in last year     Last office visit with prescriber/PCP: 7/24/2018 Danica Liang MD OR same dept: Visit date not found OR same specialty: 7/24/2018 Danica Liang MD Last physical: Visit date not found       Next appt within 3 mo: Visit date not found  Next physical within 3 mo: Visit date not found  Prescriber OR PCP: Danica Liang MD  Last diagnosis associated with med order: 1. Asthma  - albuterol (PROAIR HFA;PROVENTIL HFA;VENTOLIN HFA) 90 mcg/actuation inhaler [Pharmacy Med Name: ALBUTEROL HFA INH (200 PUFFS) 18GM]; INHALE 2 PUFFS BY MOUTH EVERY 6 HOURS AS NEEDED FOR WHEEZING.  Dispense: 18 g; Refill: 0    If protocol passes may refill for 6 months if within 3 months of last provider visit (or a total of 9 months). If patient requesting >1 inhaler per month refill x 6 months and have patient make appointment with provider.

## 2021-06-03 VITALS — BODY MASS INDEX: 37.74 KG/M2 | WEIGHT: 249 LBS | HEIGHT: 68 IN

## 2021-06-04 VITALS
BODY MASS INDEX: 41.04 KG/M2 | HEART RATE: 68 BPM | SYSTOLIC BLOOD PRESSURE: 138 MMHG | RESPIRATION RATE: 16 BRPM | WEIGHT: 261.5 LBS | HEIGHT: 67 IN | DIASTOLIC BLOOD PRESSURE: 88 MMHG | TEMPERATURE: 98.7 F

## 2021-06-04 VITALS
BODY MASS INDEX: 40.15 KG/M2 | OXYGEN SATURATION: 98 % | HEIGHT: 67 IN | WEIGHT: 255.8 LBS | DIASTOLIC BLOOD PRESSURE: 68 MMHG | HEART RATE: 82 BPM | SYSTOLIC BLOOD PRESSURE: 110 MMHG

## 2021-06-04 NOTE — TELEPHONE ENCOUNTER
RN cannot approve Refill Request    RN can NOT refill this medication PCP messaged that patient is overdue for Office Visit. Last office visit: 7/24/2018 Danica Liang MD Last Physical: Visit date not found Last MTM visit: Visit date not found Last visit same specialty: 7/24/2018 Danica Liang MD.  Next visit within 3 mo: Visit date not found  Next physical within 3 mo: Visit date not found      Fuad Babb, [facility] Triage/Med Refill 1/4/2020    Requested Prescriptions   Pending Prescriptions Disp Refills     albuterol (PROAIR HFA;PROVENTIL HFA;VENTOLIN HFA) 90 mcg/actuation inhaler [Pharmacy Med Name: ALBUTEROL HFA INH (200 PUFFS) 18GM] 18 g 0     Sig: INHALE 2 PUFFS BY MOUTH EVERY 6 HOURS AS NEEDED FOR WHEEZING       Albuterol/Levalbuterol Refill Protocol Failed - 1/4/2020  3:12 PM        Failed - PCP or prescribing provider visit in last year     Last office visit with prescriber/PCP: 7/24/2018 Danica Liang MD OR same dept: Visit date not found OR same specialty: 7/24/2018 Danica Liang MD Last physical: Visit date not found       Next appt within 3 mo: Visit date not found  Next physical within 3 mo: Visit date not found  Prescriber OR PCP: Danica Liang MD  Last diagnosis associated with med order: 1. Asthma  - albuterol (PROAIR HFA;PROVENTIL HFA;VENTOLIN HFA) 90 mcg/actuation inhaler [Pharmacy Med Name: ALBUTEROL HFA INH (200 PUFFS) 18GM]; INHALE 2 PUFFS BY MOUTH EVERY 6 HOURS AS NEEDED FOR WHEEZING  Dispense: 18 g; Refill: 0    If protocol passes may refill for 6 months if within 3 months of last provider visit (or a total of 9 months). If patient requesting >1 inhaler per month refill x 6 months and have patient make appointment with provider.

## 2021-06-05 VITALS
SYSTOLIC BLOOD PRESSURE: 136 MMHG | HEART RATE: 72 BPM | RESPIRATION RATE: 16 BRPM | TEMPERATURE: 97.7 F | DIASTOLIC BLOOD PRESSURE: 94 MMHG

## 2021-06-05 VITALS
DIASTOLIC BLOOD PRESSURE: 95 MMHG | OXYGEN SATURATION: 97 % | WEIGHT: 278 LBS | HEART RATE: 80 BPM | TEMPERATURE: 98.7 F | RESPIRATION RATE: 15 BRPM | BODY MASS INDEX: 43.54 KG/M2 | SYSTOLIC BLOOD PRESSURE: 152 MMHG

## 2021-06-05 VITALS
HEART RATE: 80 BPM | WEIGHT: 274.9 LBS | HEIGHT: 67 IN | BODY MASS INDEX: 43.15 KG/M2 | SYSTOLIC BLOOD PRESSURE: 132 MMHG | DIASTOLIC BLOOD PRESSURE: 78 MMHG | OXYGEN SATURATION: 95 %

## 2021-06-06 NOTE — TELEPHONE ENCOUNTER
RN cannot approve Refill Request    RN can NOT refill this medication med is not covered by policy/route to provider     . Last office visit: 7/24/2018 Danica Liang MD Last Physical: Visit date not found Last MTM visit: Visit date not found Last visit same specialty: 7/24/2018 Danica Liang MD.  Next visit within 3 mo: Visit date not found  Next physical within 3 mo: Visit date not found      Glenny Dhillon, [facility] Triage/Med Refill 3/15/2020    Requested Prescriptions   Pending Prescriptions Disp Refills     budesonide-formoteroL (SYMBICORT) 160-4.5 mcg/actuation inhaler [Pharmacy Med Name: BUDESONIDE/FORM 160/4.5MCG(120 INH)] 1 Inhaler 11     Sig: INHALE 2 PUFFS BY MOUTH TWICE DAILY       There is no refill protocol information for this order

## 2021-06-06 NOTE — TELEPHONE ENCOUNTER
Left message to call back for:phone appt  Information to relay to patient: voice mail left and my chart message sent

## 2021-06-06 NOTE — PROGRESS NOTES
Assessment/Plan:   Start: 8:03 AM  End: 8:12 AM    Phone number dialed: [phone]. This visit was completed telephonically in an effort to minimize this patient's contact with the healthcare system in the setting of the COVID-19 pandemic.      Problem List Items Addressed This Visit        [facility] Cardiac Problems    Allergic Rhinitis    Relevant Medications    albuterol (PROAIR HFA;PROVENTIL HFA;VENTOLIN HFA) 90 mcg/actuation inhaler    cetirizine (ZYRTEC) 10 mg cap    Asthma - Primary     The patient had been out of Symbicort for the past month.  She became increasingly symptomatic requiring albuterol more than once daily and awakening during the night with symptoms.  She refilled her Symbicort a couple of days ago and is already starting to feel better.  Her ACT score was 13 today reflecting poor control.  Plan will be to recheck an ACT over the phone in a month and I reiterated the importance of being on her daily controller on a consistent basis.           Relevant Medications    albuterol (PROAIR HFA;PROVENTIL HFA;VENTOLIN HFA) 90 mcg/actuation inhaler    cetirizine (ZYRTEC) 10 mg cap        Annabella is a 49-year-old female with a history of chronic persistent asthma.  Her ACT score was low today reflecting poor control over the past month.  She is back on her Symbicort and I reminded her that she is overdue for preventive care testing and she is scheduled for an annual physical exam in July.    Subjective:       49 y.o. female scheduled for a virtual phone visit due to concerns about coronavirus.  The patient is quite overdue for an office visit and she states she was not aware it had been so long but does have an appointment now scheduled for an annual physical exam in about 3 to 4 months once it hopefully is safe to see her.  The patient has a history of persistent asthma and states that she was out of her Symbicort for about a month.  She became quite symptomatic during this time awakening during the  night with symptoms, awakening every morning with wheezing and chest tightness, as well as having a cough.  The patient picked up her Symbicort 2 days ago and is already feeling a little better.  Historically she states that as long as she takes her Symbicort on a consistent basis her asthma is well controlled.  She did receive albuterol from a friend who is a medical person and so she has had some of that but would like a refill today.  She is aware she is overdue for preventive care testing such as mammogram and Pap smear.      Reviewed: The following portions of the patient's history were reviewed and updated as appropriate: allergies, current medications, past family history, past medical history, past social history, past surgical history and problem list.    Review of Systems  Pertinent items are noted in HPI.        Objective:     No exam performed today, virtual visit.      This note has been dictated using voice recognition software. Any grammatical or context distortions are unintentional and inherent to the software

## 2021-06-06 NOTE — TELEPHONE ENCOUNTER
Annabella has not been seen in clinic for 2 years.  However, a refill request came through for her Symbicort as she has a history of persistent asthma.  Please reach out and help arrange a phone visit for this patient.  She really needs to be seen in clinic but we are deferring those until July due to concerns about coronavirus.

## 2021-06-07 NOTE — TELEPHONE ENCOUNTER
Prior Authorization Request  Who s requesting:  Pharmacy  Pharmacy Name and Location: [id number]  Medication Name: Symbicort  Insurance Plan:  [employer]  Insurance Member ID Number: [id number]  CoverMyMeds Key: [id number]  Informed patient that prior authorizations can take up to 10 business days for response:   Yes  Okay to leave a detailed message: No

## 2021-06-08 NOTE — TELEPHONE ENCOUNTER
RN cannot approve Refill Request    RN can NOT refill this medication med is not covered by policy/route to provider. Last office visit: 7/24/2018 Danica Liang MD Last Physical: Visit date not found Last MTM visit: Visit date not found Last visit same specialty: 7/24/2018 Danica Liang MD.  Next visit within 3 mo: Visit date not found  Next physical within 3 mo: Visit date not found      Emelyn Cortez, [facility] Triage/Med Refill 6/11/2020    Requested Prescriptions   Pending Prescriptions Disp Refills     SYMBICORT 160-4.5 mcg/actuation inhaler [Pharmacy Med Name: SYMBICORT 160/4.5MCG (120 ORAL INH)] 1 Inhaler 0     Sig: INHALE 2 PUFFS BY MOUTH TWICE DAILY       There is no refill protocol information for this order

## 2021-06-09 NOTE — PROGRESS NOTES
Assessment/Plan:      Healthy female exam.   Problem List Items Addressed This Visit        [employer] Cardiac Problems    Asthma     Patient's ACT score is borderline at 19 but overall she feels it is improving as she improves her nutrition.  Continue her current Symbicort            Other    Morbid obesity (H)     Patient started about a week and a half ago on improving her nutrition and exercise has seen some weight loss.  Discussed resources and ultimately referral was made medical weight management and considering reconsultation to discuss weight loss medication options         Relevant Orders    Ambulatory referral to Extended 24 Week Healthy Lifestyle Plan    Lipid Profile    Comprehensive Metabolic Panel    Glycosylated Hemoglobin A1c    Thyroid Cascade      Other Visit Diagnoses     Routine general medical examination at a health care facility    -  Primary    Encounter for screening mammogram for breast cancer        Relevant Orders    Mammo Screening Bilateral               Subjective:      Annabella Guillen is a 49 y.o. female who presents for an annual exam.  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  She has seen a significant amount of weight gain as she deviated from her nutrition plan and also had heightened stress, however, she is motivated and has started some positive lifestyle changes in the past week and a half.    Healthy Habits:   Regular Exercise: getting back into exercise  Sunscreen Use: No  Healthy Diet: just started on a healthy eating program  Dental Visits Regularly: Yes  Seat Belt: Yes  Sexually active: Yes  Colon cancer screening: N/A  Lipid Profile: Yes  Glucose Screen: Yes  Prevention of Osteoporosis: No  Last Dexa: N/A      Immunization History   Administered Date(s) Administered     Influenza, inj, historic,unspecified 11/14/2016     Influenza,seasonal quad, PF, =/> 6months  2016, 11/10/2017     MMR 2014     Td, adult adsorbed, PF 11/10/2017     Immunization status: up to date and documented.    Gynecologic History  No LMP recorded.  Contraception: none  Last Pap: . Results were: normal  Last mammogram: . Results were: normal      OB History    Para Term  AB Living   1 1 1         SAB TAB Ectopic Multiple Live Births                  # Outcome Date GA Lbr Tray/2nd Weight Sex Delivery Anes PTL Lv   1 Term                Current Outpatient Medications   Medication Sig Dispense Refill     albuterol (PROAIR HFA;PROVENTIL HFA;VENTOLIN HFA) 90 mcg/actuation inhaler Inhale 2 puffs every 4 (four) hours as needed for wheezing. 18 g 3     cetirizine (ZYRTEC) 10 mg cap Take 10 mg by mouth daily.  0     SYMBICORT 160-4.5 mcg/actuation inhaler INHALE 2 PUFFS BY MOUTH TWICE DAILY 1 Inhaler 4     No current facility-administered medications for this visit.      Past Medical History:   Diagnosis Date     Asthma      Syncope     etiology unknown     Past Surgical History:   Procedure Laterality Date     FL REDUCTION OF LARGE BREAST      Description: Breast Surgery Reduction Procedure;  Recorded: 2014;     FL REMOVAL OF TONSILS,<13 Y/O      Description: Tonsillectomy;  Recorded: 2014;     REDUCTION MAMMAPLASTY Bilateral 2000     Amoxicillin-pot clavulanate and Penicillins  Family History   Problem Relation Age of Onset     Asthma Mother      Atrial fibrillation Mother      Asthma Son      Ovarian cancer Sister      Endometrial cancer Sister      Hypertension Father      Social History     Socioeconomic History     Marital status:      Spouse name: Not on file     Number of children: Not on file     Years of education: Not on file     Highest education level: Not on file   Occupational History     Not on file   Social Needs     Financial resource strain: Not on file     Food insecurity     Worry: Not on file     Inability: Not on file      "Transportation needs     Medical: Not on file     Non-medical: Not on file   Tobacco Use     Smoking status: Former Smoker     Packs/day: 0.25     Years: 10.00     Pack years: 2.50     Types: Cigarettes     Last attempt to quit: 2011     Years since quittin.5     Smokeless tobacco: Never Used   Substance and Sexual Activity     Alcohol use: Yes     Comment: rare     Drug use: No     Sexual activity: Not on file   Lifestyle     Physical activity     Days per week: Not on file     Minutes per session: Not on file     Stress: Not on file   Relationships     Social connections     Talks on phone: Not on file     Gets together: Not on file     Attends Faith service: Not on file     Active member of club or organization: Not on file     Attends meetings of clubs or organizations: Not on file     Relationship status: Not on file     Intimate partner violence     Fear of current or ex partner: Not on file     Emotionally abused: Not on file     Physically abused: Not on file     Forced sexual activity: Not on file   Other Topics Concern     Not on file   Social History Narrative     Not on file       Review of Systems  General:  Denies problem  Eyes: Denies problem  Ears/Nose/Throat: Denies problem  Cardiovascular: Denies problem  Respiratory:  Denies problem  Gastrointestinal:  Denies problem, Genitourinary: Denies problem  Musculoskeletal:  Denies problem  Skin: Denies problem  Neurologic: Denies problem  Psychiatric: Denies problem  Endocrine: Denies problem  Heme/Lymphatic: Denies problem   Allergic/Immunologic: Denies problem        Objective:         Vitals:    20 0735   BP: 110/68   Pulse: 82   SpO2: 98%   Weight: (!) 255 lb 12.8 oz (116 kg)   Height: 5' 7\" (1.702 m)     Body mass index is 40.06 kg/m .    Physical Exam:  General Appearance: Alert, cooperative, no distress, appears stated age  Head: Normocephalic, without obvious abnormality, atraumatic  Eyes: PERRL, conjunctiva/corneas clear, EOM's " intact  Ears: Normal TM's and external ear canals, both ears  Nose: Nares normal, septum midline,mucosa normal, no drainage  Throat: Lips, mucosa, and tongue normal; teeth and gums normal  Neck: Supple, symmetrical, trachea midline, no adenopathy;  thyroid: not enlarged, symmetric, no tenderness/mass/nodules; no carotid bruit or JVD  Back: Symmetric, no curvature, ROM normal, no CVA tenderness  Lungs: Clear to auscultation bilaterally, respirations unlabored  Breasts: No breast masses, tenderness, asymmetry, or nipple discharge.  Heart: Regular rate and rhythm, S1 and S2 normal, no murmur, rub, or gallop, Abdomen: Soft, non-tender, bowel sounds active all four quadrants,  no masses, no organomegaly  Pelvic:Normally developed genitalia with no external lesions or eruptions. Vagina and cervix show no lesions, inflammation, discharge or tenderness.  Extremities: Extremities normal, atraumatic, no cyanosis or edema  Skin: Skin color, texture, turgor normal, no rashes or lesions  Lymph nodes: Cervical, supraclavicular, and axillary nodes normal  Neurologic: Normal

## 2021-06-09 NOTE — TELEPHONE ENCOUNTER
No answer/left message with the main scheduling number to call back:  [phone]    Patient is interested in a 3 pack of health and wellness coaching sessions.    Called today to try and get an appointment scheduled.    MARIA DEL CARMEN Mckeon, [facility]  National Board Certified Health &   24 Week Healthy Lifestyle Program Health   [place] [facility]  [phone]  email:  [email or web address]

## 2021-06-09 NOTE — TELEPHONE ENCOUNTER
Refill Approved    Rx renewed per Medication Renewal Policy. Medication was last renewed on 3/18/2020.    Pamela Chaves, [facility] Triage/Med Refill 7/16/2020     Requested Prescriptions   Pending Prescriptions Disp Refills     albuterol (PROAIR HFA;PROVENTIL HFA;VENTOLIN HFA) 90 mcg/actuation inhaler [Pharmacy Med Name: ALBUTEROL HFA INH (200 PUFFS) 18GM] 18 g 3     Sig: INHALE 2 PUFFS BY MOUTH EVERY 4 HOURS AS NEEDED FOR WHEEZING       Albuterol/Levalbuterol Refill Protocol Passed - 7/16/2020 10:33 AM        Passed - PCP or prescribing provider visit in last year     Last office visit with prescriber/PCP: 7/24/2018 Danica Liang MD OR same dept: Visit date not found OR same specialty: 7/24/2018 Danica Liang MD Last physical: 7/16/2020       Next appt within 3 mo: Visit date not found  Next physical within 3 mo: Visit date not found  Prescriber OR PCP: Danica Liang MD  Last diagnosis associated with med order: 1. Asthma  - albuterol (PROAIR HFA;PROVENTIL HFA;VENTOLIN HFA) 90 mcg/actuation inhaler [Pharmacy Med Name: ALBUTEROL HFA INH (200 PUFFS) 18GM]; INHALE 2 PUFFS BY MOUTH EVERY 4 HOURS AS NEEDED FOR WHEEZING  Dispense: 18 g; Refill: 3    If protocol passes may refill for 6 months if within 3 months of last provider visit (or a total of 9 months). If patient requesting >1 inhaler per month refill x 6 months and have patient make appointment with provider.

## 2021-06-10 NOTE — PROGRESS NOTES
"GLADIS MEDINA Reason for Visit: Health Coaching visit #2 of 3  Referred by: Danica Liang  Progress Notes:  Health Coaching Note #2 of 3  Annabella Guillen   MRN: [id number]  : 1970  ANH: 2020  Health  Visit #: 2 of 3  Telephone Visit: yes  Start Date:  8/3/2020  Graduation Date:  2021  Physician:  Dr. Liang     ASSESSMENT:  Initial Weight: 250 lbs.  Current Weight (lbs): 250 lbs. (weighs 1x/week on )  Average Daily Water (ounces): really bad at this.  Has to chug water in order to ever drink any.  Has to make herself drink it.  Weight Loss Medication: -  Nutrition Plan: Real whole foods    PREVIOUS GOAL(S) REVIEW:  Sleep: bed by 10:30/11:00 pm - has done this a couple times in the past couple weeks and is not easy to do.  Goal book:  \"Today I am grateful for, top 5 goals, top 5 things to support my goals, etc\" (15 min. in the morning to fill it out) Been doing this about 50% of the time.     PILLAR(S) DISCUSSED IN THIS VISIT:  Sleep: wants to continue to work on this.  Nutrition: LOVES to cook.  [employer] - bought an eggplant and made lasagna  Stress Management: constant with work and having boundaries during our Covid-19 situation. Difficult to plan and process thru during this uncertain time of Pandemic.  Other: reached out to HR asking for help for the team to take better care of themselves thru this Covid-19 time.  Example:  Send a text to a group of 12 people. Include statement about it \"getting late and will update in the morning\".  Boundaries at work continues with her and the team.    GOALS:  Sleep:  Getting to bed by 10:30/11:00 pm which allows for her mornings to get ready for the day.  (4 workdays/week)  Stress Management: intentional about identifying time for self each day/week.  Other:  Writing in the Goals book consistently (5 days/week)     Notes:  NOTES:  Working from home now. Is never really able to shut off from work.  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  2006: 160 lbs. And over those 14 years, has gradually added on the weight.  2006:  Focused on self and 1 area of work to focus on.  Wants to feel good in the skin she is in.  (look at getting to 200, then 175)    Sleep: bed by 11pm now but feels tired in the mornings.  Nutrition: 1 coffee each morning.  Stress Management: trying to put some boundaries back in place for work life balance.  Literally gets calls at 6am until 12midnight each day.  Talking to her team about being respectful with each other.  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  Oversees all of the policies etc for this company.  Working 16-20 hours/day.  Doesn't eat well in the morning.  Has experience with [employer] (Medifast) and lost 60 lbs. But now has put back on some weight.     Constantly eating from 6:30-10 pm.  \"so bad at routine\"  Night owl by nature but has moved up bedtime from 2am to 1am to midnight and now 11pm.  In regards to weight loss, I feel like I can't even get any amount of time to focus on myself to work on anything.  In March, ready to quit her job. The stress was simply too much.  Multi-taking comes easy for her.  She is now choosing not to multi-task like she has before.  Feeling ethically bound to leave her work in March.  Important to her in how long in life, a person can be active and healthy.    FOLLOW-Up:   at 3pm (#3 of 3)    TIME:  20 minutes spent with patient, 10 minutes charting time.     SIGNATURE:  MARIA DEL CARMEN Mckeon, [facility]  National Board Certified Health &   24 Week Healthy Lifestyle Program Health   [facility]  email:  [email or web address]  appointment [phone]  "

## 2021-06-10 NOTE — PROGRESS NOTES
"GLADIS MEDINA Reason for Visit: Health Coaching 3-pack  Referred by: Danica Liang  Progress Notes:  Health Coaching Note #1 of 3  Annabella Guillen   MRN: [id number]  : 1970  ANH: 2020  Health  Visit #: 1 of 3  Telephone Visit: yes  Start Date:  8/3/2020  Graduation Date:  2021  Physician:  Dr. Liang    ASSESSMENT:  Initial Weight: 250 lbs.  Current Weight (lbs): 250 lbs. (weighs 1x/week on )  Average Daily Water (ounces): really bad at this.  Has to chug water in order to ever drink any.  Has to make herself drink it.  Weight Loss Medication: -  Nutrition Plan: Real whole foods    PILLAR(S) DISCUSSED IN THIS VISIT:  Sleep: bed by 11pm now but feels tired in the mornings.  Nutrition: 1 coffee each morning.  Stress Management: trying to put some boundaries back in place for work life balance.  Literally gets calls at 6am until 12midnight each day.  Talking to her team about being respectful with each other.  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  Working 16-20 hours/day.  Doesn't eat well in the morning.  Has experience with [employer] (Medif[employer]) and lost 60 lbs. But now has put back on some weight.     Constantly eating from 6:30-10 pm.  \"so bad at routine\"  Night owl by nature but has moved up bedtime from 2am to 1am to midnight and now 11pm.  In regards to weight loss, I feel like I can't even get any amount of time to focus on myself to work on anything.  In March, ready to quit her job. The stress was simply too much.  Multi-taking comes easy for her.  She is now choosing not to multi-task like she has before.  Feeling ethically bound to leave her work in March.  Important to her in how long in life, a person can be active and healthy.      GOALS:   Sleep: bed by 11pm  Goal book:  \"Today I am grateful for, top 5 goals, top 5 things to support my goals, etc\" (15 min. in the " morning to fill it out)    NOTES:  Working from home now. Is never really able to shut off from work.  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  2006: 160 lbs. And over those 14 years, has gradually added on the weight.  2006:  Focused on self and 1 area of work to focus on.  Wants to feel good in the skin she is in.  (look at getting to 200, then 175)    FOLLOW-Up:   at 3pm.    TIME:  20 minutes spent with patient, 10 minutes charting time.     SIGNATURE:  MARIA DEL CARMEN Mckeon, [facility]  National Board Certified Health &   24 Week Healthy Lifestyle Program Health   [facility]  email:  [email or web address]  appointment [phone]

## 2021-06-11 NOTE — PROGRESS NOTES
Assessment/Plan:     Problem List Items Addressed This Visit     Asthma     Using albuterol 3 times a week which is definitely greater than 5 times in a month.  Only using Symbicort every other to every third day because of the side effect of a hoarse voice.  Will stop Symbicort and move over to montelukast as majority of symptoms are at night.  Recheck with patient in 6 weeks to see if improving if not then will look at adding an inhaled corticosteroid.         Relevant Medications    montelukast (SINGULAIR) 10 mg tablet    Strain of calf muscle, right, initial encounter - Primary     Home exercises discussed and demonstrated.  Will start physical therapy.  Muscle relaxant for night use.  Encouraged to use NSAIDs during the day and gentle stretching.         Relevant Medications    cyclobenzaprine (FLEXERIL) 10 MG tablet    Other Relevant Orders    Ambulatory referral to PT/OT      Other Visit Diagnoses     Need for vaccination        Relevant Orders    Tdap vaccine,  6yo or older,  IM (Completed)        Return for recheck in 10-14 days if not improving..    Subjective:   49 y.o. female presents for right calf pain.  Started yesterday.  She was standing behind a vehicle and the dog jumped out.  She jumped back and twisted and felt a sharp pain in the back of her leg.  She has had swelling but it feels tight.  No recent travel.  No numbness.  No shortness of breath.  Hurts to pull toe up with pain behind knee that shoots down the center of the calf.  No weakness.  She did use ibuprofen and icing.  When she gets up and moves around it will hurt for a little bit that improves yet if she twists slightly the pain will come back.  No locking or giving out.  No anterior joint line pain.    Reviewing her asthma she does admit that she is only using her Symbicort every other day or maybe just 3 times per week.  She finds when she use it continually she gets a hoarse voice.  With that she is needing her albuterol inhaler at  "least 3 times a week at night.      Review of Systems   Constitutional: Negative for chills, fatigue and fever.   Eyes: Negative for visual disturbance.   Respiratory: Positive for wheezing. Negative for chest tightness and shortness of breath.    Cardiovascular: Negative for chest pain, palpitations and leg swelling.   Gastrointestinal: Negative for constipation, diarrhea, nausea and vomiting.   Genitourinary: Negative for difficulty urinating.   Skin: Negative for rash.        History     Reviewed By Date/Time Sections Reviewed    Marcos Allan DO 9/9/2020  2:03 PM Medical, Surgical, Tobacco, Family, Socioeconomic    David Juan Jr., [facility] 9/9/2020  2:00 PM Tobacco    David Juan Jr., [facility] 9/9/2020  1:53 PM Tobacco           Objective:     Vitals:    09/09/20 1353 09/09/20 1412   BP: (!) 148/96 138/88   Pulse: 68    Resp: 16    Temp: 98.7  F (37.1  C)    TempSrc: Oral    Weight: (!) 261 lb 8 oz (118.6 kg)    Height: 5' 7\" (1.702 m)    PainSc:   6    PainLoc: Leg    LMP: 08/09/2020     Physical Exam  Vitals signs and nursing note reviewed.   Constitutional:       General: She is not in acute distress.     Appearance: Normal appearance.   HENT:      Head: Normocephalic and atraumatic.   Cardiovascular:      Rate and Rhythm: Normal rate and regular rhythm.      Pulses: Normal pulses.      Heart sounds: Normal heart sounds.   Pulmonary:      Effort: Pulmonary effort is normal.      Breath sounds: Normal breath sounds.   Musculoskeletal:      Comments: Right knee with full active range of motion.  Negative Brenda, Lockman, drawer, Apley.  No joint line tenderness.  Mild tenderness palpation in the hamstring area insertion area that reproduces presenting pain.  Neurovascularly intact distally with capillary refill less than 2 seconds.   Skin:     Capillary Refill: Capillary refill takes less than 2 seconds.      Findings: No bruising, erythema or rash.   Neurological:      Mental Status: She is " alert and oriented to person, place, and time.   Psychiatric:         Mood and Affect: Mood normal.         Thought Content: Thought content normal.         This note has been dictated using voice recognition software. Any grammatical or context distortions are unintentional and inherent to the software

## 2021-06-11 NOTE — PROGRESS NOTES
"Annabella Guillen is a 49 y.o. female who is being evaluated via a billable video visit.      The patient has been notified of following:     \"This video visit will be conducted via a call between you and your physician/provider. We have found that certain health care needs can be provided without the need for an in-person physical exam.  This service lets us provide the care you need with a video conversation.  If a prescription is necessary we can send it directly to your pharmacy.  If lab work is needed we can place an order for that and you can then stop by our lab to have the test done at a later time.    Video visits are billed at different rates depending on your insurance coverage. Please reach out to your insurance provider with any questions.    If during the course of the call the physician/provider feels a video visit is not appropriate, you will not be charged for this service.\"    Patient has given verbal consent to a Video visit? Yes  How would you like to obtain your AVS? AVS Preference: MyChart.  If dropped by the video visit, the video invitation should be sent to: Other e-mail: my chart  Will anyone else be joining your video visit? No        Patient verbally consented to the video service today YES. Patient location during visit: Home in [place].    Subjective: Annabella is being seen today via video visit. Today's visit is in regards to possible shingles. The patient reports a recent exacerbartion of her asthma for which she was on oral steroids. On Sunday she started having pain in her right upper back/posterior shoulder blade. There was also an odd sensation on her lateral breast. She then noticed a rash in clusters of blisters late Sunday night. She is otherwise feeling well.    We also discussed her ongoing exposure to COVID-19. 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 She has regular testing and has never been positive or " gotten sick. She did have an antibody test in June that was negative.    Objective:   GENERAL: healthy, alert and no distress, EYES: Eyes grossly normal to inspection, conjunctivae and sclerae normal, RESP: no audible wheeze, cough, or visible cyanosis.  No visible retractions or increased work of breathing.  Able to speak fully in complete sentences., NEURO: Cranial nerves grossly intact, mentation intact and speech normal, and PSYCH: mentation appears normal, affect normal/bright, judgement and insight intact, normal speech and appearance well-groomed. SKIN: clusters of blisters on a erythematous base on patient's right flank.    Assessment/Plan:  Problem List Items Addressed This Visit     None      Visit Diagnoses     Herpes zoster without complication    -  Primary    Relevant Medications    valACYclovir (VALTREX) 500 MG tablet    Exposure to COVID-19 virus        Relevant Orders    COVID-19 Virus (Coronavirus) Antibody & Titer Reflex         There are no Patient Instructions on file for this visit.     Video-Visit Details    Video Start Time: 9:14 AM  Type of service:  Video Visit    Video End Time (time video stopped): 9:22 AM  Originating Location (pt. Location): Home in [place]    Distant Location (provider location):  [facility]    Platform used for Video Visit: Isidro Hogue MD

## 2021-06-11 NOTE — PROGRESS NOTES
Assessment:   The encounter diagnosis was Moderate persistent asthma with acute exacerbation.     Plan:     Outpatient Encounter Medications as of 9/21/2020   Medication Sig Dispense Refill     albuterol (PROAIR HFA;PROVENTIL HFA;VENTOLIN HFA) 90 mcg/actuation inhaler INHALE 2 PUFFS BY MOUTH EVERY 4 HOURS AS NEEDED FOR WHEEZING 18 g 3     cetirizine (ZYRTEC) 10 mg cap Take 10 mg by mouth daily.  0     cyclobenzaprine (FLEXERIL) 10 MG tablet Take 0.5 tablets (5 mg total) by mouth every 8 (eight) hours as needed for muscle spasms. 20 tablet 0     montelukast (SINGULAIR) 10 mg tablet Take 1 tablet (10 mg total) by mouth daily. 60 tablet 2     predniSONE (DELTASONE) 20 MG tablet Take 20 mg by mouth 2 (two) times a day for 5 days. 10 tablet 0     No facility-administered encounter medications on file as of 9/21/2020.      There are no Patient Instructions on file for this visit.  No follow-ups on file.     Subjective:   Annabella Guillen is a 49 y.o. female who submitted an eVisit request for evaluation of her No chief complaint on file..  See the questionnaire and message section of encounter report for information related to history of present illness and review of systems.    The following portions of the patient's history were reviewed and updated as appropriate:  She does not have any pertinent problems on file.    She is allergic to amoxicillin-pot clavulanate and penicillins..     Objective:   No exam performed today, patient submitted as eVisit.Provider E-Visit time total (minutes): 5

## 2021-06-13 NOTE — TELEPHONE ENCOUNTER
Fax from pharmacy requesting refill   BUDESONIDE/FORM 160/4.5 MCG (120 inh)  Inhale 2 puffs by mouth twice daily  Quantity 10.2  RX not on current med list.  OK to refill?

## 2021-06-13 NOTE — TELEPHONE ENCOUNTER
Fax from pharmacy requesting PA.    Prior Authorization Request  Who s requesting:  Pharmacy  [id number] [address] [facility] [address]  Medication Name:   budesonide-formoteroL (SYMBICORT) 160-4.5 mcg/actuation inhaler 1 Inhaler 12 12/4/2020     Sig - Route: Inhale 2 puffs 2 (two) times a day. - Inhalation    Sent to pharmacy as: budesonide-formoteroL  mcg-4.          Insurance Plan: [facility] [employer] A[employer]  Insurance Member ID Number:  [id number]  CoverMyMeds Key: N/A  Informed patient that prior authorizations can take up to 10 business days for response:   No  Okay to leave a detailed message: Yes

## 2021-06-13 NOTE — TELEPHONE ENCOUNTER
Central PA team  [phone]  Pool: HE PA MED ([id number])          PA has been initiated.       PA form completed and faxed insurance via Cover My Meds     Key:  [id number]     Medication:  budesonide-formoteroL (SYMBICORT) 160-4.5 mcg/actuation inhaler    Insurance:  [employer]        Response will be received via fax and may take up to 5-10 business days depending on plan

## 2021-06-14 NOTE — PROGRESS NOTES
"Assessment:     1. Asthma  albuterol (VENTOLIN HFA) 90 mcg/actuation inhaler   2. Encounter for screening mammogram for breast cancer  Mammo Screening Bilateral       Plan:     Annabella is a 46-year-old female presenting today for routine asthma check visit.  The patient is doing well with Symbicort twice daily although her ACT today does not reflect this as she reports that she has been struggling with a cold for the past couple of weeks.  I provided her with a refill on her Ventolin to take as needed.  The patient was referred for screening mammogram.  Lastly, the patient received a tetanus as well as an influenza vaccine today.    Subjective:       46 y.o. female presents for evaluation of asthma. The patient would like to get her flu vaccine today as well. The patient has been on a \"weight loss journey\" with her  and they have been doing very well. She has lost just over 30 pounds to date. The patient has asthma and is doing much better since eliminating \"wheat products.\" She continues to do well with the Symbicort and is using it just once daily.     The following portions of the patient's history were reviewed and updated as appropriate: allergies, current medications, past family history, past medical history, past social history, past surgical history and problem list.    Review of Systems  Pertinent items are noted in HPI.     History   Smoking Status     Never Smoker   Smokeless Tobacco     Never Used       Objective:      /72 (Patient Site: Left Arm, Patient Position: Sitting, Cuff Size: Adult Large)  Pulse 74  Ht 5' 7\" (1.702 m)  Wt (!) 237 lb 12.8 oz (107.9 kg)  Breastfeeding? No  BMI 37.24 kg/m2  General appearance: alert, appears stated age and cooperative  Lungs: clear to auscultation bilaterally  Heart: regular rate and rhythm, S1, S2 normal, no murmur, click, rub or gallop       This note has been dictated using voice recognition software. Any grammatical or context distortions are " unintentional and inherent to the software
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
## 2021-06-15 NOTE — PROGRESS NOTES
Annabella Guillen is a 50 y.o. female who is being evaluated via a billable video visit.      How would you like to obtain your AVS? MyChart.  If dropped from the video visit, the video invitation should be resent by: Text to cell phone: [phone]   Will anyone else be joining your video visit? No      Video Start Time: 10:00    Assessment & Plan   Problem List Items Addressed This Visit     Morbid obesity (H)     The patient successfully lost about 60 pounds on her own over a year ago but has regained that weight since the pandemic it.  She is interested in getting back down again although does not feel she has the capacity to focus on it right now but is already making some positive lifestyle changes to improve her nutrition, decrease alcohol and increase exercise.         JANETH (generalized anxiety disorder) - Primary     The patient does feel less stress and decrease in anxiety on Lexapro 10 mg daily.  However, she is finding that it causes her affect to be quite a bit flatter and almost feels that it causes her to feel a little bit depressed.  She prefers to get off the medication and try to manage the stress better on her own and we talked about decreasing to 5 mg daily for the next 4 days and then stopping.  I did place a referral for consultation with Tiffany , in order to help support her through managing her stress.           Other Visit Diagnoses     Stress        Relevant Orders    AMB REFERRAL TO MENTAL HEALTH AND ADDICTION  - Adult (18+); Outpatient Treatment; Individual/Couples/Family/Group Therapy/Health Psychology; [facility]; [facility]; We will contact you to schedule the appointment or please...           Return in about 3 months (around 5/10/2021) for Recheck.    Danica Liang MD  [facility]      Subjective   Annabella Guillen is 50 y.o. and presents today for the following health issues   HPI Annabella is a 50-year-old female  presenting today for a follow-up regarding Lexapro.  The patient states that she took the 5 mg dose for the first week and then increased it to 10 mg daily.  She notes that she has had improvement in her sleep and she does feel a decrease in anxiety.  However, she does not like how she feels on it.  She describes it in general her affect simply feels flat and she would prefer not to continue on the medication.  She feels that she was capable of making some very positive lifestyle changes and losing 60 pounds over a year ago and would really like to get to that place again.  She did find it helpful to meet with a psychologist during that time for support and help with managing her stress and anxiety.  The patient mentions that she was offered a promotion at work and is still processing that.  She has significantly reduced her alcohol intake over the past month and definitely feels better than when she met with me last.        Objective       Vitals:  No vitals were obtained today due to virtual visit.    Physical Exam  Well appearing female in no distress          Video-Visit Details    Type of service:  Video Visit    Video End Time (time video stopped): 10:21 AM  Originating Location (pt. Location): Home    Distant Location (provider location):  [facility]     Platform used for Video Visit: adflyer

## 2021-06-15 NOTE — PROGRESS NOTES
"Chief Complaint   Patient presents with     Foot Swelling     4 days     Hypertension     Headache     Blood pressure (!) 152/95, pulse 80, temperature 98.7  F (37.1  C), resp. rate 15, weight (!) 278 lb (126.1 kg), last menstrual period 02/24/2021, SpO2 97 %, not currently breastfeeding.         SUBJECTIVE       Annabella is a 50 y.o.  female with a PMH significant for:     Patient Active Problem List   Diagnosis     Allergic Rhinitis     Asthma     Psoriasis     Morbid obesity (H)     Strain of calf muscle, right, initial encounter     JANETH (generalized anxiety disorder)       Annabella is here today for multiple different problems.  Her story started on Sunday with a headache.  She then reports that on Monday she with a headache, lower extremity swelling, and \"a funny feeling.\"  She took her blood pressure at that time and it was in the 160s over 110s.  This is new for her.  She is unclear if the blood pressure cuffs she was using were correct, although she does use 2 blood pressure cuffs.  She is also concerned that she is having lower extremity edema.  She has had a few episodes in the past, however they have happened during times of immobility but then resolved with movement.  This episode is different however, as she has had swelling since Monday.  She reports that the left leg is slightly bigger than the right.  The swelling goes up to her calves bilaterally.  There is no pain in her calves.  She does not have any pain with walking.  No shortness of breath, paroxysmal nocturnal dyspnea, orthopnea.  She does not have a history of heart failure or MI.  She has never been told she has had high blood pressure in the past.  She does not feel that her heart rate has been elevated.  She does report that she drinks 1 gallon a day and is on the Medifast diet in order to lose weight.  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  She flies regularly to " get to the sites she manages.  She does not have any redness of the skin of the lower extremities.  She currently denies any redness.  She has not fainted.  She does have an appointment set up with her PCP in [place] on March 25.  She is also wondering if she can find with her high blood pressure.  She does note that she has gained around 7 pounds recently.    Her mother has a h/o a. Fib.    PMH, Medications and Allergies were reviewed and updated as needed.          OBJECTIVE     Vitals:    03/11/21 1951 03/11/21 1955   BP: (!) 153/98 (!) 152/95   Pulse: 80    Resp: 15    Temp: 98.7  F (37.1  C)    SpO2: 97%    Weight: (!) 278 lb (126.1 kg)      Body mass index is 43.54 kg/m .    Constitutional: Awake, alert, cooperative, no acute distress, and appears stated age.  Eyes: sclera clear, conjunctiva normal.  Lungs: No increased WOB, CTABL, no crackles or wheezing appreciated.  Cardiovascular: Appears well perfused, RRR, normal S1 and S2, no S3 or S4, and no murmur appreciated.  Musculoskeletal: 1+ peripheral edema noted bilaterally, left is slightly larger than right.  No pain with standing or flexing her left calf.  Skin: Skin of the feet and calves are normal, without erythema.        ASSESSMENT AND PLAN     Annabella was seen today for foot swelling, hypertension and headache.    Diagnoses and all orders for this visit:    Peripheral edema  -     BNP(B-type Natriuretic Peptide)  -     Comprehensive Metabolic Panel    Hypertension, unspecified type    Patient presenting with new onset peripheral edema and high blood pressure.  With her recent weight gain there is some suspicion of increased fluid status, however she does not have any other signs of congestive heart failure including orthopnea, paroxysmal dyspnea, or shortness of breath with exercise.  Her vitals are stable.  DVT and pulmonary embolism unlikely as she is not having any calf pain and does not have any shortness of breath or tachycardia or hypoxia.   Atrial fibrillation/arrhythmia is also in the differential but low.  Most likely she is having venous insufficiency, offered compression stockings however she would like to wait a little longer to see if this resolves before buying them.  With rest her blood pressure recommended that she take her blood pressure a few times between now and her next appointment consider starting antihypertensives if she has multiple elevated blood pressures at home.  At this time will check a CMP and a BNP in order to assess for heart failure, kidney injury, and electrolyte abnormalities.  Follow-up visit scheduled on 3/25 with PCP.  Recommended immediate follow-up with presentation to emergency department if she develops any calf pain or shortness of breath.    If symptoms persist can consider echocardiogram and a trial of Lasix.    Patient declined AVS    Candelario Perez  3/11/2021

## 2021-06-15 NOTE — TELEPHONE ENCOUNTER
Left message to call back for: Annabella  Information to relay to patient:  Dr. Hogue needs to see patient for OV to evaluate her sx. Please change [phone] Telephone visit to a OV if OK with patient. Thank you.

## 2021-06-16 NOTE — TELEPHONE ENCOUNTER
Refill Approved    Rx renewed per Medication Renewal Policy. Medication was last renewed on 10/28/20.    [place], [facility] Triage/Med Refill 3/30/2021     Requested Prescriptions   Pending Prescriptions Disp Refills     albuterol (PROAIR HFA;PROVENTIL HFA;VENTOLIN HFA) 90 mcg/actuation inhaler [Pharmacy Med Name: ALBUTEROL HFA INH(200 PUFFS)18GM] 18 g 3     Sig: INHALE 2 PUFFS BY MOUTH EVERY 4 HOURS AS NEEDED FOR WHEEZING       Albuterol/Levalbuterol Refill Protocol Passed - 3/29/2021  1:00 PM        Passed - PCP or prescribing provider visit in last year     Last office visit with prescriber/PCP: 1/11/2021 Danica Liang MD OR same dept: 1/11/2021 Danica Liang MD OR same specialty: 1/11/2021 Danica Liang MD Last physical: 7/16/2020       Next appt within 3 mo: Visit date not found  Next physical within 3 mo: Visit date not found  Prescriber OR PCP: Danica Liang MD  Last diagnosis associated with med order: 1. Asthma  - albuterol (PROAIR HFA;PROVENTIL HFA;VENTOLIN HFA) 90 mcg/actuation inhaler [Pharmacy Med Name: ALBUTEROL HFA INH(200 PUFFS)18GM]; INHALE 2 PUFFS BY MOUTH EVERY 4 HOURS AS NEEDED FOR WHEEZING  Dispense: 18 g; Refill: 3    If protocol passes may refill for 6 months if within 3 months of last provider visit (or a total of 9 months). If patient requesting >1 inhaler per month refill x 6 months and have patient make appointment with provider.

## 2021-06-17 NOTE — TELEPHONE ENCOUNTER
Telephone Encounter by Chester Dunn at 12/11/2020  3:47 PM     Author: Chester Dunn Service: -- Author Type: Patient Access    Filed: 12/11/2020  3:51 PM Encounter Date: 12/8/2020 Status: Signed    : Chester Dunn (Patient Access)       Prior Authorization Not Needed     Insurance details: [employer]'s message:        Pharmacy details: [id number] [place], [place] - [address] AT [facility] [address]   Phone: [phone]  Fax: [phone]  Details: I did call and leave the insurance message to the pharmacy voicemail. I suggested that they process with a ONELIA 9 instead of a ONELIA 1 as the provider did not send the original as a ONELIA 1 - if that did not work, I said that I could send a message to the provider requesting a new Rx be sent as a ONELIA 1. They have my direct number if they need to contact me.

## 2021-06-17 NOTE — TELEPHONE ENCOUNTER
Telephone Encounter by Chester Dunn at 5/7/2020 10:48 AM     Author: Chester Dunn Service: -- Author Type: --    Filed: 5/7/2020 11:09 AM Encounter Date: 5/5/2020 Status: Signed    : Chester Dunn       PRIOR AUTHORIZATION NOT NEEDED    I called the pharmacy and spoke with a team member and they were thinking that they originally ran it for generic and that was not covered and switched it to brand, which was covered under the patient's plan and the request for coverage got sent accidentally. The patient does have a MFG coupon on her profile and her co-pay was brought down to $25.00 which he was thinking would be ok with the patient. The provider would have to resend the Rx with the ONELIA 1; currently the Rx is labeled as it is ok to dispense a generic. I will close and sign the encounter as the request was not actually requested and it was a mistakenly sent by the pharmacy.

## 2021-06-17 NOTE — PROGRESS NOTES
Assessment/Plan:     Problem List Items Addressed This Visit        [facility] Cardiac Problems    Essential hypertension, benign - Primary     Newly diagnosed hypertension today.  I prescribed lisinopril 10 mg daily.  I encouraged weight loss and increase exercise and follow-up in 1 month for blood pressure recheck and labs.         Relevant Medications    lisinopriL (PRINIVIL,ZESTRIL) 10 MG tablet            Subjective:       50 y.o. female presents today for follow-up regarding elevated blood pressure readings.  The patient presented on March 11 to walk-in care due to concerns about headache and swelling of her ankles and feet.  She had a complete evaluation and her blood pressure was elevated.  Her swelling has gone down and likely was due to some travel.  However, her blood pressure has remained elevated.  The patient did restart a structured meal replacement program about a week ago and she has lost 5 pounds so far.  She is hopeful that she is going to be able to stick with this and is motivated to lose weight.      Reviewed: The following portions of the patient's history were reviewed and updated as appropriate: allergies, current medications, past family history, past medical history, past social history, past surgical history and problem list.    Review of Systems  Pertinent items are noted in HPI.        Objective:     BP (!) 136/94 (Patient Site: Left Arm, Patient Position: Sitting, Cuff Size: Adult Large)   Pulse 72   Temp 97.7  F (36.5  C) (Oral)   Resp 16   LMP 05/01/2021 (Exact Date)   Breastfeeding No   General appearance: alert, appears stated age and cooperative      This note has been dictated using voice recognition software. Any grammatical or context distortions are unintentional and inherent to the software

## 2021-06-19 NOTE — PROGRESS NOTES
"Assessment:     1. Acute sinusitis         Plan:     Annabella is a 47-year-old female presenting today with symptoms that I think are most consistent with an acute sinusitis.  However, she does have a history of asthma and feels a sensation of swelling around her throat.  I recommended empirically treated with some prednisone 20 mgs twice daily for 2 days in addition to treating her sinus infection with a Z-Jaime.    Subjective:       47 y.o. female presents for evaluation of upper respiratory symptoms.  The patient reports that her illness started about a week ago with upper respiratory symptoms including a cough.  However, the patient has been feeling copious postnasal drainage with a sensation of swelling around her throat.  She awoke early this morning with a sensation that it was difficult to breathe because of throat swelling.  She drinks some tea and ultimately coughed up a large dark colored amount of thick mucus and after that her breathing was much better.  She denies chest tightness and it does not feel like her typical asthma.  She does not really feel sinus pain or pressure and denies any associated fever.  She has been complaining of some left ear pain as well.      Reviewed: The following portions of the patient's history were reviewed and updated as appropriate: allergies, current medications, past family history, past medical history, past social history, past surgical history and problem list.    Review of Systems  Pertinent items are noted in HPI.       Objective:     /78 (Patient Site: Left Arm, Patient Position: Sitting, Cuff Size: Adult Large)  Pulse 74  Ht 5' 8\" (1.727 m)  Wt (!) 249 lb (112.9 kg)  Breastfeeding? No  BMI 37.86 kg/m2  General appearance: alert, appears stated age and cooperative  Ears: normal TM's and external ear canals both ears  Throat: lips, mucosa, and tongue normal; teeth and gums normal  Lungs: clear to auscultation bilaterally  Heart: regular rate and rhythm, " S1, S2 normal, no murmur, click, rub or gallop      This note has been dictated using voice recognition software. Any grammatical or context distortions are unintentional and inherent to the software

## 2021-06-26 NOTE — PROGRESS NOTES
"Progress Notes by Sunday Becerril MD at 4/9/2018 12:50 PM     Author: Sunday Becerril MD Service: -- Author Type: Physician    Filed: 4/9/2018  1:17 PM Encounter Date: 4/9/2018 Status: Addendum    : Sunday Becerril MD (Physician)    Related Notes: Original Note by Sunday Becerril MD (Physician) filed at 4/9/2018  1:16 PM           Click to link to [facility]     [facility]ARE NOTE    Thank you, Dr. Danica Liang MD, for asking the [facility] team to see Ms. Annabella Guillen to evaluate Chest Pain.      Assessment/Recommendations   Assessment:    1. Presyncope with palpitations -this was short-lived with relative severe symptoms.  It is unknown what caused this at this time.  It seems to be similar to an episode about 15 years ago.  I reviewed potential evaluation options with the patient, however since this was an isolated event and has not occurred with any regularity monitoring with Holter or event monitor is unlikely to yield significant information.  If she does have recurrent episodes I told her to call her office and we will then order cardiac monitoring at that time.  2. Chest pain -this did not sound anginal in nature.  The patient is at low risk for coronary artery disease based on the [employer] ASCVD cardiac risk calculator.  Further ischemic evaluation at this time is not necessary.    Plan:  1. Watchful waiting and reassurance.  2. I encouraged regular exercise  3. Plan for cardiac rhythm monitoring with Holter or event monitor should this patient's symptoms recur or become more predictable and regular.         History of Present Illness   Ms. Annabella Guillen is a 47 y.o. female with no prior cardiac history presents for rapid access clinic referral after an ER visit last week where she presented with atypical chest pain and near-syncope.     Last Friday, 4/6, she experienced chest pain. This was described as a \"charley horse\" " with associated pain in her left axilla, blurry vision, and muffled hearing. Onset was sudden. Lasted about 1-2 minutes at most and resolved. After her symptoms improved she started to have some diaphoresis. She was in a meeting so she wasn't able to tell whether this was made better or worse with activity. No other aggravating or alleviating factors identified. This was associated with palpitations and irregular heart beats. She notes that she had some diarrhea prior to onset of symptoms.    She called Dr. Mcgregor's office who advised she present to the ER.  In the [facility] ER she was evaluated with an EKG, telemetry monitoring, lab work, and chest x-ray, all of which were essentially normal.  She was advised to follow-up in this clinic for any additional evaluation that may need to be performed.    She had a similar episode more than 10 years ago with overt syncope that felt like a 'panic attack'. This was followed by a day of nausea and vomiting. A holter monitor obtained at that time, per patient, demonstrated PVCs. No other episodes since then until last week.    Other than noted above, Ms. Guillen denies any chest pain/pressure/tightness, shortness of breath at rest or with exertion, light headedness/dizziness, pre-syncope, syncope, lower extremity swelling, palpitations, paroxysmal nocturnal dyspnea (PND), or orthopnea.     Cardiac Problems and Cardiac Diagnostics     Most Recent Cardiac testing:  ECG dated 4/6/18 (personaly reviewed and interpreted): normal sinus rhythm. Normal ECG    ECHO (report reviewed): no prior echo  Echo results:      Stress test: no prior stress test    Cardiac cath: no prior coronary angiogram     Medications  Allergies   Current Outpatient Prescriptions   Medication Sig Dispense Refill   ? albuterol (PROVENTIL) 2.5 mg /3 mL (0.083 %) nebulizer solution USE 1 DOSE IN NEBULIZER EVERY 4 HOURS AS NEEDED 15 vial 0   ? albuterol (VENTOLIN HFA) 90 mcg/actuation inhaler INHALE 2 PUFFS BY  MOUTH EVERY 6 HOURS AS NEEDED FOR WHEEZING 18 g 4   ? budesonide-formoterol (SYMBICORT) 160-4.5 mcg/actuation inhaler Inhale 2 puffs 2 (two) times a day. 1 Inhaler 12   ? cetirizine (ZYRTEC) 10 mg cap Take by mouth.     ? omeprazole (PRILOSEC) 40 MG capsule TAKE 1 CAPSULE(40 MG) BY MOUTH DAILY 90 capsule 3     No current facility-administered medications for this visit.       Allergies   Allergen Reactions   ? Amoxicillin-Pot Clavulanate Unknown     Augmentin   ? Penicillins Nausea And Vomiting        Physical Examination Review of Systems   Vitals:    04/09/18 1251   BP: 122/78   Pulse: 84   Resp: 16     Body mass index is 36.34 kg/(m^2).  Wt Readings from Last 3 Encounters:   04/09/18 (!) 239 lb (108.4 kg)   04/06/18 (!) 230 lb (104.3 kg)   11/10/17 (!) 237 lb 12.8 oz (107.9 kg)       General Appearance:   no distress, mildly obese   ENT/Mouth: membranes moist, no apparent gingival bleeding.      EYES:  no scleral icterus, normal conjunctivae   Neck: no carotid bruits. No anterior cervical lymphadenopaty   Respiratory:   lungs are clear to auscultation, no rales or wheezing, equal chest wall expansion    Cardiovascular:   Regular rhythm, normal rate. Normal first and second heart sounds with no murmurs, rubs, or gallops; the carotid, radial and posterior tibial pulses are intact, Jugular venous pressure normal, no edema bilaterally    Abdomen/GI:  no organomegaly, masses, bruits, or tenderness; bowel sounds are present   Extremities: no cyanosis or clubbing   Skin: no xanthelasma, warm.    Heme/lymph/ Immunology No apparent bleeding noted.   Neurologic: Alert and oriented. normal gait, no tremors     Psychiatric: Pleasant, calm, appropriate affect.    A complete 10 system review of systems was performed and is negative except as mentioned in the HPI or below:  General: WNL  Eyes: Visual Distubance  Ears/Nose/Throat: Hearing Loss  Lungs: WNL  Heart: Chest Pain, Arm Pain, Fainting  Stomach: WNL  Bladder:  WNL  Muscle/Joints: WNL  Skin: WNL  Nervous System: Falls  Mental Health: Anxiety     Blood: WNL       Past History   Past Medical History:   Past Medical History:   Diagnosis Date   ? Asthma    ? Syncope 2005    etiology unknown       Past Surgical History:   Past Surgical History:   Procedure Laterality Date   ? [place] REDUCTION OF LARGE BREAST      Description: Breast Surgery Reduction Procedure;  Recorded: 02/18/2014;   ? [place] REMOVAL OF TONSILS,<13 Y/O      Description: Tonsillectomy;  Recorded: 02/18/2014;   ? REDUCTION MAMMAPLASTY Bilateral 2000       Family History:   Family History   Problem Relation Age of Onset   ? Asthma Mother    ? Atrial fibrillation Mother    ? Asthma Son    ? Ovarian cancer Sister    ? Endometrial cancer Sister    ? Hypertension Father        Social History:   Social History     Social History   ? Marital status:      Spouse name: N/A   ? Number of children: N/A   ? Years of education: N/A     Occupational History   ? Not on file.     Social History Main Topics   ? Smoking status: Former Smoker     Packs/day: 0.25     Years: 10.00     Types: Cigarettes     Quit date: 1/1/2011   ? Smokeless tobacco: Never Used   ? Alcohol use Yes      Comment: rare   ? Drug use: No   ? Sexual activity: Not on file     Other Topics Concern   ? Not on file     Social History Narrative             Lab Results    Chemistry/lipid CBC Cardiac Enzymes/BNP/TSH/INR   Lab Results   Component Value Date    CHOL 173 06/16/2014    HDL 51 06/16/2014    LDLCALC 110 06/16/2014    TRIG 60 06/16/2014    CREATININE 0.69 04/06/2018    BUN 11 04/06/2018    K 4.3 04/06/2018     04/06/2018     04/06/2018    CO2 25 04/06/2018    Lab Results   Component Value Date    WBC 11.1 (H) 12/14/2016    HGB 15.1 12/14/2016    HCT 45.3 12/14/2016    MCV 93 12/14/2016     12/14/2016    Lab Results   Component Value Date    TROPONINI <0.01 04/06/2018    TSH 3.04 06/16/2014          Sunday Becerril MD  Non-invasive  Cardiology  [facility]

## 2021-08-24 DIAGNOSIS — J45.41 MODERATE PERSISTENT ASTHMA WITH ACUTE EXACERBATION: ICD-10-CM

## 2021-08-24 RX ORDER — ALBUTEROL SULFATE 90 UG/1
AEROSOL, METERED RESPIRATORY (INHALATION)
Qty: 18 G | OUTPATIENT
Start: 2021-08-24

## 2021-08-24 RX ORDER — ALBUTEROL SULFATE 90 UG/1
2 AEROSOL, METERED RESPIRATORY (INHALATION) EVERY 6 HOURS PRN
Qty: 18 G | Refills: 1 | Status: SHIPPED | OUTPATIENT
Start: 2021-08-24 | End: 2021-08-31

## 2021-08-24 NOTE — TELEPHONE ENCOUNTER
Refilled 3/30/21 with 5 refills.    Disp Refills Start End ONELIA    albuterol (PROAIR HFA;PROVENTIL HFA;VENTOLIN HFA) 90 mcg/actuation inhaler 18 g 5 3/30/2021  No   Sig: INHALE 2 PUFFS BY MOUTH EVERY 4 HOURS AS NEEDED FOR WHEEZING   Sent to pharmacy as: albuterol sulfate HFA 90 mcg/actuation aerosol inhaler (PROAIR HFA;PROVENTIL HFA;VENTOLIN HFA)   E-Prescribing Status: Receipt confirmed by pharmacy (3/30/2021  3:43 PM CDT)

## 2021-08-30 ENCOUNTER — NURSE TRIAGE (OUTPATIENT)
Dept: NURSING | Facility: CLINIC | Age: 51
End: 2021-08-30

## 2021-08-30 NOTE — TELEPHONE ENCOUNTER
Annabella reports new coughing started 2 days ago. She ate a pice of chip and feels like a tiny piece of chip is stuck in her throat, causing her to cough. She also has asthma and now has wheezing when she exhales. Used albuterol inhaler but only helps a little. Feels like her throat is inflamed as she keeps coughing to clear her throat    No SOB. No fever.    Able to eat and drink. No trouble swallowing.    Per protocol, advised OV today or WID if there are no clinic openings. Care advice reviewed. Patient verbalizes understanding.     Patti Marks RN/[place] Nurse Advisor          Reason for Disposition    [1] Symptoms of pill stuck in throat or esophagus (e.g., pain in throat or chest, FB sensation) AND [2] no relief after using CARE ADVICE    MODERATE asthma attack (e.g., SOB at rest, speaks in phrases, audible wheezes) and not resolved after 2 nebulizer or inhaler treatments given 20 minutes apart    Additional Information    Negative: Any difficulty breathing (e.g., coughing, wheezing or stridor)    Negative: Sounds like a life-threatening emergency to the triager    Negative: Symptoms of blocked esophagus (e.g., can't swallow normal secretions, drooling)    Negative: Symptoms of FB stuck in throat or esophagus (e.g., continued pain in throat or chest, FB sensation, blood-tinged saliva) (Exception: pill stuck)    Negative: Can't swallow water or bread    Negative: Sharp object  (e.g., needle, nail, safety pin, toothpick, bone, bottle cap, pull tab, dental bridge work)     (Exception: tiny chips of glass generally pass without any symptoms)    Negative: Button battery (or other battery) known or suspected    Negative: Magnet    Negative: Packet of drugs (e.g., condom filled with cocaine)    Negative: Swallowed 2 or more FBs (e.g., multiple objects)    Negative: Swallowed a poisonous object (e.g., a lead sinker or a bullet)    Negative: Swallowed a (non-food) FB on purpose    Negative: SEVERE symptoms of pill  stuck in throat or esophagus (e.g., severe pain, bleeding, or inability to swallow liquids)    Negative: Blood in the stools    Negative: [1] Abdominal pain AND [2] FB hasn't passed    Negative: [1] Vomited 2 or more times AND [2] FB hasn't passed    Negative: Patient sounds very sick or weak to the triager    Negative: Patient is confused or is an unreliable provider of information (e.g., dementia, severe intellectual disability, alcohol intoxication)    Negative: Severe difficulty breathing (e.g., struggling for each breath, speaks in single words)    Negative: Bluish (or gray) lips or face    Negative: Wheezing started suddenly after medicine, an allergic food, or bee sting    Negative: Passed out (i.e., fainted, collapsed and was not responding)    Negative: Sounds like a life-threatening emergency to the triager    Negative: SEVERE asthma attack (e.g., very SOB at rest, speaks in single words, loud wheezes)    Negative: SEVERE wheezing or coughing and doesn't have nebulizer or inhaler available    Negative: Peak flow rate less than 50% of baseline level (RED zone)    Negative: Hospitalized before with asthma; now feels same    Negative: Chest pain    Negative: Patient sounds very sick or weak to the triager    Protocols used: SWALLOWED FOREIGN BODY-A-AH, ASTHMA ATTACK-A-OH

## 2021-08-31 ENCOUNTER — OFFICE VISIT (OUTPATIENT)
Dept: FAMILY MEDICINE | Facility: CLINIC | Age: 51
End: 2021-08-31
Payer: COMMERCIAL

## 2021-08-31 VITALS
BODY MASS INDEX: 46.05 KG/M2 | OXYGEN SATURATION: 96 % | HEART RATE: 84 BPM | SYSTOLIC BLOOD PRESSURE: 130 MMHG | DIASTOLIC BLOOD PRESSURE: 74 MMHG | WEIGHT: 293 LBS

## 2021-08-31 DIAGNOSIS — M25.572 PAIN IN JOINT INVOLVING ANKLE AND FOOT, LEFT: ICD-10-CM

## 2021-08-31 DIAGNOSIS — M21.612 BUNION, LEFT: ICD-10-CM

## 2021-08-31 DIAGNOSIS — J45.40 MODERATE PERSISTENT ASTHMA WITHOUT COMPLICATION: Primary | ICD-10-CM

## 2021-08-31 PROCEDURE — 99214 OFFICE O/P EST MOD 30 MIN: CPT | Performed by: FAMILY MEDICINE

## 2021-08-31 RX ORDER — METHYLPREDNISOLONE 4 MG
TABLET, DOSE PACK ORAL
Qty: 21 TABLET | Refills: 0 | Status: SHIPPED | OUTPATIENT
Start: 2021-08-31 | End: 2021-12-09

## 2021-08-31 RX ORDER — LISINOPRIL 10 MG/1
10 TABLET ORAL
COMMUNITY
Start: 2021-05-03 | End: 2022-02-28

## 2021-08-31 NOTE — ASSESSMENT & PLAN NOTE
Left ankle pain x 3-4 weeks started after she stepped down out of a shuttle bus strangely and since it has been hurting. Today on exam there is no bony pain, no pain with tib/ fib compression, no pain over fifth metatarsal or mid foot and no pain over bone of medial malleolus. She does have pain over ATF ligament and in the soft tissue of the medial ankle generally with some diffuse ankle swelling.   She desires to see podiatry as she also has a bunion on that foot that she thinks is also related. Will get preliminary xray today.

## 2021-08-31 NOTE — PROGRESS NOTES
"    Assessment & Plan   Problem List Items Addressed This Visit        Nervous and Auditory    Pain in joint involving ankle and foot, left     See also bunion left foot in problem list for more details.         Relevant Medications    methylPREDNISolone (MEDROL DOSEPAK) 4 MG tablet therapy pack    Other Relevant Orders    Orthopedic  Referral    XR Ankle Left G/E 3 Views       Respiratory    Moderate persistent asthma without complication - Primary     Asthma exacerbation  She says she feels her asthma would be under better control but she ran out of her symbicort.   Inhaled tortilla chip particle and Saturday night  She now has been coughing since then and all the coughing caused her throat to feel ragged.   Feels all the coughing made the asthma worse.   Trial of medrol dose pack.   Needs follow up ACT after she has been on Symbicort and she is feeling better. .   Follow up in 2 weeks for repeat ACT         Relevant Medications    methylPREDNISolone (MEDROL DOSEPAK) 4 MG tablet therapy pack       Musculoskeletal and Integumentary    Bunion, left     Left ankle pain x 3-4 weeks started after she stepped down out of a shuttle bus strangely and since it has been hurting. Today on exam there is no bony pain, no pain with tib/ fib compression, no pain over fifth metatarsal or mid foot and no pain over bone of medial malleolus. She does have pain over ATF ligament and in the soft tissue of the medial ankle generally with some diffuse ankle swelling.   She desires to see podiatry as she also has a bunion on that foot that she thinks is also related. Will get preliminary xray today.          Relevant Medications    methylPREDNISolone (MEDROL DOSEPAK) 4 MG tablet therapy pack    Other Relevant Orders    Orthopedic  Referral              BMI:   Estimated body mass index is 46.05 kg/m  as calculated from the following:    Height as of 1/11/21: 1.702 m (5' 7\").    Weight as of this encounter: 133.4 kg (294 " lb).     No follow-ups on file.    Alexus Saldivar MD  [facility]        Joel Winchester is a 50 year old who presents for the following health issues  -she says she is been having a bit of exacerbation of her asthma related to irritation after swallowing a small piece of tortilla chip wrong.  She feels like she is cleared the tortilla chip but she did so much coughing that she feels like she irritated her lungs and her asthma.  Albuterol has been helping.  However an additional problem is that she ran out of her Symbicort and had a lapse in her therapy and she feels like this also added to her asthma exacerbation.  She would like to try some Medrol Dosepak because it helped in the past when she has had problems like this.    A second problem is that she has a bunion on her left foot and she feels like [place] and may be hurt her ankle which she feels might be related to the bunion and she had seen Dr. Masters a podiatrist in July 2020 and she would like a referral back to him as well as an evaluation of her ankle today.        Objective    /74 (BP Location: Left arm, Patient Position: Left side, Cuff Size: Adult Large)   Pulse 84   Wt 133.4 kg (294 lb)   SpO2 96%   BMI 46.05 kg/m    Body mass index is 46.05 kg/m .  Physical Exam  Constitutional:       Appearance: Normal appearance.   HENT:      Head: Normocephalic and atraumatic.   Cardiovascular:      Rate and Rhythm: Normal rate and regular rhythm.   Pulmonary:      Effort: Pulmonary effort is normal.   Musculoskeletal:         General: Normal range of motion.      Cervical back: Normal range of motion and neck supple.   Neurological:      General: No focal deficit present.      Mental Status: She is alert and oriented to person, place, and time.        Left foot/ ankle exam -   Today on exam there is no bony pain, no pain with tib/ fib compression, no pain over fifth  metatarsal or mid foot and no pain over bone of medial malleolus. She does have pain over ATF ligament and in the soft tissue of the medial ankle generally with some diffuse ankle swelling.

## 2021-08-31 NOTE — ASSESSMENT & PLAN NOTE
Asthma exacerbation  She says she feels her asthma would be under better control but she ran out of her symbicort.   Inhaled tortilla chip particle and Saturday night  She now has been coughing since then and all the coughing caused her throat to feel ragged.   Feels all the coughing made the asthma worse.   Trial of medrol dose pack.   Needs follow up ACT after she has been on Symbicort and she is feeling better. .   Follow up in 2 weeks for repeat ACT

## 2021-09-02 ENCOUNTER — OFFICE VISIT (OUTPATIENT)
Dept: PODIATRY | Facility: CLINIC | Age: 51
End: 2021-09-02
Attending: FAMILY MEDICINE
Payer: COMMERCIAL

## 2021-09-02 VITALS — HEART RATE: 85 BPM | WEIGHT: 293 LBS | HEIGHT: 67 IN | BODY MASS INDEX: 45.99 KG/M2 | OXYGEN SATURATION: 97 %

## 2021-09-02 DIAGNOSIS — S93.422D SPRAIN OF DELTOID LIGAMENT OF LEFT ANKLE, SUBSEQUENT ENCOUNTER: ICD-10-CM

## 2021-09-02 DIAGNOSIS — M20.10 HALLUX VALGUS, UNSPECIFIED LATERALITY: Primary | ICD-10-CM

## 2021-09-02 DIAGNOSIS — M25.472 EDEMA OF LEFT ANKLE: ICD-10-CM

## 2021-09-02 PROCEDURE — 99214 OFFICE O/P EST MOD 30 MIN: CPT | Performed by: PODIATRIST

## 2021-09-02 ASSESSMENT — MIFFLIN-ST. JEOR: SCORE: 1996.21

## 2021-09-02 NOTE — LETTER
9/2/2021         RE: Annabella Guillen  [address] [facility] [place] [address]        Dear Colleague,    Thank you for referring your patient, Annabella Guillen, to the [facility]. Please see a copy of my visit note below.    FOOT AND ANKLE SURGERY/PODIATRY Progress Note        ASSESSMENT:   Hallux abductovalgus left foot  Pronation deformity both feet  Left ankle sprain  Edema left ankle        TREATMENT:  I have recommended a  bunionectomy with first metatarsal osteotomy with internal screw fixation left foot.  I have also recommended orthotics.  The patient stated she will contemplate having the procedure done at a later date.  When she is prepared to move forward she will contact the clinic at the appropriate time.  I am recommending a cam boot for the painful left ankle sprain.  I also recommend an elastic ankle brace to manage the edema of the left ankle.        HPI: The patient presented to the clinic today planing of a painful left ankle.  The patient stated that in August she fell off of a curb and severely sprained her left ankle.  The pain has slightly improved.  She continues to have moderate to severe swelling.  She stated that her pain is increased by the end of the day.  She has not had any associated redness.  She did have an x-ray taken of her left ankle.  The x-rays were negative for any fractures or dislocations.  The patient also stated that she would like to address her painful bunion which she has had diagnosed 2 years ago.  She has been unable to have the surgery performed.  She is thinking of having the bunion corrected before the end of this year.       Past Medical History:   Diagnosis Date     Asthma      Essential hypertension, benign 5/3/2021     NO ACTIVE PROBLEMS      Syncope 2005    etiology unknown        Past Surgical History:   Procedure Laterality Date     HC REDUCTION OF LARGE BREAST       HC REDUCTION OF LARGE BREAST      Description: Breast Surgery  Reduction Procedure;  Recorded: 02/18/2014;     HC REMOVAL OF TONSILS,<11 Y/O      Description: Tonsillectomy;  Recorded: 02/18/2014;     MAMMOPLASTY REDUCTION Bilateral 2000     SINUS SURGERY         Allergies   Allergen Reactions     Amoxicillin-Pot Clavulanate      Augmentin     Penicillins Nausea and Vomiting         Current Outpatient Medications:      albuterol (ALBUTEROL) 108 (90 BASE) MCG/ACT Inhaler, 2 puffs, Disp: , Rfl:      budesonide-formoterol (SYMBICORT) 160-4.5 MCG/ACT Inhaler, Inhale 2 puffs into the lungs, Disp: , Rfl:      cetirizine HCl (ZYRTEC ALLERGY) 10 MG CAPS, Take 10 mg by mouth daily, Disp: , Rfl:      lisinopril (ZESTRIL) 10 MG tablet, Take 10 mg by mouth, Disp: , Rfl:      methylPREDNISolone (MEDROL DOSEPAK) 4 MG tablet therapy pack, Follow Package Directions, Disp: 21 tablet, Rfl: 0     omeprazole (PRILOSEC) 40 MG capsule, Take 40 mg by mouth daily, Disp: , Rfl:     Family History   Problem Relation Age of Onset     Coronary Artery Disease Mother      Hypertension Mother      Diabetes Sister      Other Cancer Sister         ovarian and uterin     Asthma Mother      Atrial fibrillation Mother      Asthma Son      Ovarian Cancer Sister      Endometrial Cancer Sister      Hypertension Father        Social History     Socioeconomic History     Marital status:      Spouse name: Not on file     Number of children: Not on file     Years of education: Not on file     Highest education level: Not on file   Occupational History     Occupation: Nurse   Tobacco Use     Smoking status: Former Smoker     Packs/day: 0.25     Years: 10.00     Pack years: 2.50     Types: Cigarettes, Cigarettes     Quit date: 1/1/2011     Years since quitting: 10.6     Smokeless tobacco: Never Used     Tobacco comment: quit 2000   Substance and Sexual Activity     Alcohol use: Yes     Alcohol/week: 2.0 standard drinks     Types: 2 Standard drinks or equivalent per week     Comment: Alcoholic Drinks/day: rare      Drug use: No     Sexual activity: Not on file   Other Topics Concern     Parent/sibling w/ CABG, MI or angioplasty before 65F 55M? Not Asked   Social History Narrative    Education: Master's in nursing        Children: Geremias 21, Adrianne 25, Lucy 15, Fuentes 12     Social Determinants of Health     Financial Resource Strain:      Difficulty of Paying Living Expenses:    Food Insecurity:      Worried About Running Out of Food in the Last Year:      Ran Out of Food in the Last Year:    Transportation Needs:      Lack of Transportation (Medical):      Lack of Transportation (Non-Medical):    Physical Activity:      Days of Exercise per Week:      Minutes of Exercise per Session:    Stress:      Feeling of Stress :    Social Connections:      Frequency of Communication with Friends and Family:      Frequency of Social Gatherings with Friends and Family:      Attends Alevism Services:      Active Member of Clubs or Organizations:      Attends Club or Organization Meetings:      Marital Status:    Intimate Partner Violence:      Fear of Current or Ex-Partner:      Emotionally Abused:      Physically Abused:      Sexually Abused:        10 point Review of Systems is negative     There were no vitals taken for this visit.    BMI= There is no height or weight on file to calculate BMI.    OBJECTIVE:  General appearance: Patient is alert and fully cooperative with history & exam.  No sign of distress is noted during the visit.  Vascular: Dorsalis pedis and posterior tibial pulses are palpable. There is no pedal hair growth bilaterally.  CFT < 3 sec from anterior tibial surface to distal digits bilaterally. There is  appreciable edema noted left ankle.  Dermatologic: Turgor and texture are within normal limits. No coloration or temperature changes. No primary or secondary lesions noted.  Neurologic: All epicritic and proprioceptive sensations are grossly intact bilaterally.  Musculoskeletal: All active and passive ankle,  subtalar, midtarsal joint range of motion are grossly intact without pain or crepitus, with the exception of the first MPJ left foot. Manual muscle strength is within normal limits bilaterally. All dorsiflexors, plantarflexors, invertors, evertors are intact bilaterally. Tenderness present to the first MPJ left foot on palpation.  No tenderness to the left ankle with range of motion. Calf is soft/non-tender without warmth/induration    Imaging:         XR Ankle Left G/E 3 Views    Result Date: 8/31/2021  EXAM: XR ANKLE LEFT G/E 3 VIEWS LOCATION: [facility] DATE/TIME: 8/31/2021 9:34 AM INDICATION:  Pain in joint involving ankle and foot, left COMPARISON: None.     IMPRESSION: No fracture. The ankle mortise is intact. Mild soft tissue swelling around the ankle. Small plantar calcaneal spur.             Azael Terry DPM  [facility] Foot & Ankle Surgery/Podiatry         Again, thank you for allowing me to participate in the care of your patient.        Sincerely,        Azael Faye DPM

## 2021-09-02 NOTE — PATIENT INSTRUCTIONS
Office Locations    [facility]  [address] [place] [address]  Home Medical Equipment, [address]   Phone: [phone]   Orthotics and Prosthetics, [address]   Phone: [phone]    [facility]  Home Medical Equipment  [address] [facility], [address], [place] [address]   Phone: [phone]    Orthotics and Prosthetics ([facility])    [address], [address], [place] [address]  Phone: [phone]    [facility] [place]  [address]   [place] [address]   Phone: [phone]    [facility]  [address]  [place] [address]  Phone: [phone]    [facility]   [address]  [place] [address]  Phone: [phone]    [facility]  [address]  [place] [address]  Phone: [phone]    [facility]  [address] [facility] [address]  [address]  [place] [address]  Phone: [phone]    [place]   [address] [place] [address]   Phone: [phone]

## 2021-09-02 NOTE — PROGRESS NOTES
FOOT AND ANKLE SURGERY/PODIATRY Progress Note        ASSESSMENT:   Hallux abductovalgus left foot  Pronation deformity both feet  Left ankle sprain  Edema left ankle        TREATMENT:  I have recommended a  bunionectomy with first metatarsal osteotomy with internal screw fixation left foot.  I have also recommended orthotics.  The patient stated she will contemplate having the procedure done at a later date.  When she is prepared to move forward she will contact the clinic at the appropriate time.  I am recommending a cam boot for the painful left ankle sprain.  I also recommend an elastic ankle brace to manage the edema of the left ankle.        HPI: The patient presented to the clinic today planing of a painful left ankle.  The patient stated that in August she fell off of a curb and severely sprained her left ankle.  The pain has slightly improved.  She continues to have moderate to severe swelling.  She stated that her pain is increased by the end of the day.  She has not had any associated redness.  She did have an x-ray taken of her left ankle.  The x-rays were negative for any fractures or dislocations.  The patient also stated that she would like to address her painful bunion which she has had diagnosed 2 years ago.  She has been unable to have the surgery performed.  She is thinking of having the bunion corrected before the end of this year.       Past Medical History:   Diagnosis Date     Asthma      Essential hypertension, benign 5/3/2021     NO ACTIVE PROBLEMS      Syncope 2005    etiology unknown        Past Surgical History:   Procedure Laterality Date     HC REDUCTION OF LARGE BREAST       HC REDUCTION OF LARGE BREAST      Description: Breast Surgery Reduction Procedure;  Recorded: 02/18/2014;     HC REMOVAL OF TONSILS,<11 Y/O      Description: Tonsillectomy;  Recorded: 02/18/2014;     MAMMOPLASTY REDUCTION Bilateral 2000     SINUS SURGERY         Allergies   Allergen Reactions     Amoxicillin-Pot  Clavulanate      Augmentin     Penicillins Nausea and Vomiting         Current Outpatient Medications:      albuterol (ALBUTEROL) 108 (90 BASE) MCG/ACT Inhaler, 2 puffs, Disp: , Rfl:      budesonide-formoterol (SYMBICORT) 160-4.5 MCG/ACT Inhaler, Inhale 2 puffs into the lungs, Disp: , Rfl:      cetirizine HCl (ZYRTEC ALLERGY) 10 MG CAPS, Take 10 mg by mouth daily, Disp: , Rfl:      lisinopril (ZESTRIL) 10 MG tablet, Take 10 mg by mouth, Disp: , Rfl:      methylPREDNISolone (MEDROL DOSEPAK) 4 MG tablet therapy pack, Follow Package Directions, Disp: 21 tablet, Rfl: 0     omeprazole (PRILOSEC) 40 MG capsule, Take 40 mg by mouth daily, Disp: , Rfl:     Family History   Problem Relation Age of Onset     Coronary Artery Disease Mother      Hypertension Mother      Diabetes Sister      Other Cancer Sister         ovarian and uterin     Asthma Mother      Atrial fibrillation Mother      Asthma Son      Ovarian Cancer Sister      Endometrial Cancer Sister      Hypertension Father        Social History     Socioeconomic History     Marital status:      Spouse name: Not on file     Number of children: Not on file     Years of education: Not on file     Highest education level: Not on file   Occupational History     Occupation: Nurse   Tobacco Use     Smoking status: Former Smoker     Packs/day: 0.25     Years: 10.00     Pack years: 2.50     Types: Cigarettes, Cigarettes     Quit date: 1/1/2011     Years since quitting: 10.6     Smokeless tobacco: Never Used     Tobacco comment: quit 2000   Substance and Sexual Activity     Alcohol use: Yes     Alcohol/week: 2.0 standard drinks     Types: 2 Standard drinks or equivalent per week     Comment: Alcoholic Drinks/day: rare     Drug use: No     Sexual activity: Not on file   Other Topics Concern     Parent/sibling w/ CABG, MI or angioplasty before 65F 55M? Not Asked   Social History Narrative    Education: Master's in nursing        Children: Geremias 21, Adrianne 25, Lucy 15,  Fuentes Muñoz     Social Determinants of Health     Financial Resource Strain:      Difficulty of Paying Living Expenses:    Food Insecurity:      Worried About Running Out of Food in the Last Year:      Ran Out of Food in the Last Year:    Transportation Needs:      Lack of Transportation (Medical):      Lack of Transportation (Non-Medical):    Physical Activity:      Days of Exercise per Week:      Minutes of Exercise per Session:    Stress:      Feeling of Stress :    Social Connections:      Frequency of Communication with Friends and Family:      Frequency of Social Gatherings with Friends and Family:      Attends Gnosticism Services:      Active Member of Clubs or Organizations:      Attends Club or Organization Meetings:      Marital Status:    Intimate Partner Violence:      Fear of Current or Ex-Partner:      Emotionally Abused:      Physically Abused:      Sexually Abused:        10 point Review of Systems is negative     There were no vitals taken for this visit.    BMI= There is no height or weight on file to calculate BMI.    OBJECTIVE:  General appearance: Patient is alert and fully cooperative with history & exam.  No sign of distress is noted during the visit.  Vascular: Dorsalis pedis and posterior tibial pulses are palpable. There is no pedal hair growth bilaterally.  CFT < 3 sec from anterior tibial surface to distal digits bilaterally. There is  appreciable edema noted left ankle.  Dermatologic: Turgor and texture are within normal limits. No coloration or temperature changes. No primary or secondary lesions noted.  Neurologic: All epicritic and proprioceptive sensations are grossly intact bilaterally.  Musculoskeletal: All active and passive ankle, subtalar, midtarsal joint range of motion are grossly intact without pain or crepitus, with the exception of the first MPJ left foot. Manual muscle strength is within normal limits bilaterally. All dorsiflexors, plantarflexors, invertors, evertors are  intact bilaterally. Tenderness present to the first MPJ left foot on palpation.  No tenderness to the left ankle with range of motion. Calf is soft/non-tender without warmth/induration    Imaging:         XR Ankle Left G/E 3 Views    Result Date: 8/31/2021  EXAM: XR ANKLE LEFT G/E 3 VIEWS LOCATION: [facility] DATE/TIME: 8/31/2021 9:34 AM INDICATION:  Pain in joint involving ankle and foot, left COMPARISON: None.     IMPRESSION: No fracture. The ankle mortise is intact. Mild soft tissue swelling around the ankle. Small plantar calcaneal spur.             Azael Faye; MILLER  [facility] Foot & Ankle Surgery/Podiatry

## 2021-10-09 ENCOUNTER — HEALTH MAINTENANCE LETTER (OUTPATIENT)
Age: 51
End: 2021-10-09

## 2021-10-18 ENCOUNTER — TELEPHONE (OUTPATIENT)
Dept: PODIATRY | Facility: CLINIC | Age: 51
End: 2021-10-18

## 2021-10-18 NOTE — TELEPHONE ENCOUNTER
Spoke with patient left ankle continues to be painful unless wearing cam boot. Will ask Dr Faye how to move forward. Patient does not want to do bunion surgery at this time.

## 2021-10-18 NOTE — TELEPHONE ENCOUNTER
Patient is calling stating she still having pain and would like to proceed with a MRI per the office visit last month.  Please place order and call patient to schedule.

## 2021-10-24 ENCOUNTER — HOSPITAL ENCOUNTER (OUTPATIENT)
Dept: MRI IMAGING | Facility: CLINIC | Age: 51
Discharge: HOME | End: 2021-10-24
Attending: PODIATRIST | Admitting: PODIATRIST
Payer: COMMERCIAL

## 2021-10-24 DIAGNOSIS — S93.422D SPRAIN OF DELTOID LIGAMENT OF LEFT ANKLE, SUBSEQUENT ENCOUNTER: ICD-10-CM

## 2021-10-24 PROCEDURE — 73721 MRI JNT OF LWR EXTRE W/O DYE: CPT | Mod: LT

## 2021-12-04 ENCOUNTER — HEALTH MAINTENANCE LETTER (OUTPATIENT)
Age: 51
End: 2021-12-04

## 2021-12-09 ENCOUNTER — OFFICE VISIT (OUTPATIENT)
Dept: FAMILY MEDICINE | Facility: CLINIC | Age: 51
End: 2021-12-09
Payer: COMMERCIAL

## 2021-12-09 VITALS
WEIGHT: 293 LBS | HEART RATE: 80 BPM | OXYGEN SATURATION: 98 % | BODY MASS INDEX: 46.05 KG/M2 | SYSTOLIC BLOOD PRESSURE: 158 MMHG | DIASTOLIC BLOOD PRESSURE: 100 MMHG

## 2021-12-09 DIAGNOSIS — K21.9 GASTROESOPHAGEAL REFLUX DISEASE WITHOUT ESOPHAGITIS: ICD-10-CM

## 2021-12-09 DIAGNOSIS — J45.40 MODERATE PERSISTENT ASTHMA WITHOUT COMPLICATION: ICD-10-CM

## 2021-12-09 DIAGNOSIS — R10.84 ABDOMINAL PAIN, GENERALIZED: ICD-10-CM

## 2021-12-09 DIAGNOSIS — I10 ESSENTIAL HYPERTENSION, BENIGN: Primary | ICD-10-CM

## 2021-12-09 DIAGNOSIS — Z13.1 SCREENING FOR DIABETES MELLITUS: ICD-10-CM

## 2021-12-09 DIAGNOSIS — M54.50 CHRONIC BILATERAL LOW BACK PAIN WITHOUT SCIATICA: ICD-10-CM

## 2021-12-09 DIAGNOSIS — G89.29 CHRONIC BILATERAL LOW BACK PAIN WITHOUT SCIATICA: ICD-10-CM

## 2021-12-09 LAB
ALBUMIN SERPL-MCNC: 4.1 G/DL (ref 3.5–5)
ALP SERPL-CCNC: 106 U/L (ref 45–120)
ALT SERPL W P-5'-P-CCNC: 62 U/L (ref 0–45)
ANION GAP SERPL CALCULATED.3IONS-SCNC: 13 MMOL/L (ref 5–18)
AST SERPL W P-5'-P-CCNC: 40 U/L (ref 0–40)
BILIRUB SERPL-MCNC: 0.4 MG/DL (ref 0–1)
BUN SERPL-MCNC: 14 MG/DL (ref 8–22)
C REACTIVE PROTEIN LHE: 2.2 MG/DL (ref 0–0.8)
CALCIUM SERPL-MCNC: 10.4 MG/DL (ref 8.5–10.5)
CHLORIDE BLD-SCNC: 105 MMOL/L (ref 98–107)
CO2 SERPL-SCNC: 20 MMOL/L (ref 22–31)
CREAT SERPL-MCNC: 0.77 MG/DL (ref 0.6–1.1)
ERYTHROCYTE [SEDIMENTATION RATE] IN BLOOD BY WESTERGREN METHOD: 28 MM/HR (ref 0–20)
GFR SERPL CREATININE-BSD FRML MDRD: >90 ML/MIN/1.73M2
GLUCOSE BLD-MCNC: 106 MG/DL (ref 70–125)
POTASSIUM BLD-SCNC: 4.2 MMOL/L (ref 3.5–5)
PROT SERPL-MCNC: 7.8 G/DL (ref 6–8)
SODIUM SERPL-SCNC: 138 MMOL/L (ref 136–145)

## 2021-12-09 PROCEDURE — 99214 OFFICE O/P EST MOD 30 MIN: CPT | Performed by: FAMILY MEDICINE

## 2021-12-09 PROCEDURE — 85652 RBC SED RATE AUTOMATED: CPT | Performed by: FAMILY MEDICINE

## 2021-12-09 PROCEDURE — 80053 COMPREHEN METABOLIC PANEL: CPT | Performed by: FAMILY MEDICINE

## 2021-12-09 PROCEDURE — 86141 C-REACTIVE PROTEIN HS: CPT | Performed by: FAMILY MEDICINE

## 2021-12-09 PROCEDURE — 36415 COLL VENOUS BLD VENIPUNCTURE: CPT | Performed by: FAMILY MEDICINE

## 2021-12-09 PROCEDURE — 83036 HEMOGLOBIN GLYCOSYLATED A1C: CPT | Performed by: FAMILY MEDICINE

## 2021-12-09 RX ORDER — ALBUTEROL SULFATE 90 UG/1
2 AEROSOL, METERED RESPIRATORY (INHALATION) EVERY 4 HOURS PRN
Qty: 18 G | Refills: 3 | Status: SHIPPED | OUTPATIENT
Start: 2021-12-09 | End: 2022-04-23

## 2021-12-09 RX ORDER — LISINOPRIL/HYDROCHLOROTHIAZIDE 10-12.5 MG
1 TABLET ORAL DAILY
Qty: 90 TABLET | Refills: 0 | Status: SHIPPED | OUTPATIENT
Start: 2021-12-09 | End: 2022-03-07

## 2021-12-09 RX ORDER — BUDESONIDE AND FORMOTEROL FUMARATE DIHYDRATE 160; 4.5 UG/1; UG/1
AEROSOL RESPIRATORY (INHALATION)
Qty: 10.2 G | Refills: 4 | Status: SHIPPED | OUTPATIENT
Start: 2021-12-09

## 2021-12-09 ASSESSMENT — ANXIETY QUESTIONNAIRES
7. FEELING AFRAID AS IF SOMETHING AWFUL MIGHT HAPPEN: SEVERAL DAYS
GAD7 TOTAL SCORE: 4
1. FEELING NERVOUS, ANXIOUS, OR ON EDGE: NOT AT ALL
2. NOT BEING ABLE TO STOP OR CONTROL WORRYING: NOT AT ALL
7. FEELING AFRAID AS IF SOMETHING AWFUL MIGHT HAPPEN: SEVERAL DAYS
GAD7 TOTAL SCORE: 4
6. BECOMING EASILY ANNOYED OR IRRITABLE: SEVERAL DAYS
4. TROUBLE RELAXING: SEVERAL DAYS
3. WORRYING TOO MUCH ABOUT DIFFERENT THINGS: SEVERAL DAYS
GAD7 TOTAL SCORE: 4
5. BEING SO RESTLESS THAT IT IS HARD TO SIT STILL: NOT AT ALL

## 2021-12-09 ASSESSMENT — PATIENT HEALTH QUESTIONNAIRE - PHQ9
SUM OF ALL RESPONSES TO PHQ QUESTIONS 1-9: 1
10. IF YOU CHECKED OFF ANY PROBLEMS, HOW DIFFICULT HAVE THESE PROBLEMS MADE IT FOR YOU TO DO YOUR WORK, TAKE CARE OF THINGS AT HOME, OR GET ALONG WITH OTHER PEOPLE: NOT DIFFICULT AT ALL
SUM OF ALL RESPONSES TO PHQ QUESTIONS 1-9: 1

## 2021-12-09 NOTE — ASSESSMENT & PLAN NOTE
Hypertension is uncontrolled. Blood pressure today 158/100, mild peripheral edema present in the bilateral lower extremities. She reports she is tolerating the lisinopril without any side effects. Added hydrochlorothiazide to the lisinopril for better blood pressure control and to address the peripheral edema. Discussed potential side effects of the medication including monitoring for lightheadedness. Plan to follow up in one month for a medication and blood pressure check.

## 2021-12-09 NOTE — PROGRESS NOTES
"  Assessment & Plan   Problem List Items Addressed This Visit        Respiratory    Moderate persistent asthma without complication     Reports her asthma is well controlled, ACT score of 22. She reports the addition of Symbicort has been helpful in reducing her symptoms and that she rarely needs to use her albuterol. Continue current regimen and follow up in 6 months.          Relevant Medications    budesonide-formoterol (SYMBICORT) 160-4.5 MCG/ACT Inhaler    albuterol (PROAIR HFA) 108 (90 Base) MCG/ACT inhaler       Digestive    Gastroesophageal reflux disease without esophagitis       Circulatory    Essential hypertension, benign - Primary     Hypertension is uncontrolled. Blood pressure today 158/100, mild peripheral edema present in the bilateral lower extremities. She reports she is tolerating the lisinopril without any side effects. Added hydrochlorothiazide to the lisinopril for better blood pressure control and to address the peripheral edema. Discussed potential side effects of the medication including monitoring for lightheadedness. Plan to follow up in one month for a medication and blood pressure check.          Relevant Medications    lisinopril-hydrochlorothiazide (ZESTORETIC) 10-12.5 MG tablet    Other Relevant Orders    Comprehensive metabolic panel      Other Visit Diagnoses     Screening for diabetes mellitus        Relevant Orders    Hemoglobin A1c    Abdominal pain, generalized        Reports diffuse back and abdominal pain, ordered abdominal CT and labs to assess for potient causes of the pain. Referred to PT to do strength building exercise    Relevant Orders    Erythrocyte sedimentation rate auto    CRP inflammation    CT Abdomen Pelvis w Contrast    Chronic bilateral low back pain without sciatica        Relevant Orders    Physical Therapy Referral         956}     BMI:   Estimated body mass index is 46.05 kg/m  as calculated from the following:    Height as of 9/2/21: 1.702 m (5' 7\").    " "Weight as of this encounter: 133.4 kg (294 lb).   Weight management plan: Discussed healthy diet and exercise guidelines    MEDICATIONS:        - Discontinue Lisinopril 10 mg daily       - Start taking lisinopril-hydrochlorothiazide 10-12.5 mg daily    Return in about 4 weeks (around 1/6/2022) for Follow up.    FRANCIE DOMINGUEZBag[facility] Medical CenterCORTEZ Winchester is a 50 year old who presents for the following health issues medication check and abdominal pain.    HPI   Annabella Guillen is a 50 year old who who presents to the clinic today for a medication check. She also described a concern of back pain radiating to her abdomen. The pain has been present for about one year and began when she had shingles. She explains that the pain has progressed and has now traveled into her diffuse abdomen and the left side of her back. At its worse she says the pain gets to a 10/10 awakening her from sleep. A few days ago she was sitting on the toilet when she noticed a sharp pain that felt like a hernia in her umbilical area. She said the pain lasted about a minute and went away after putting some pressure on it. She also has noticed \"charley horse cramping\" in her stomach intermittently. She has tried ibuprofen for the pain which helped, and reports the pain is worse when she feels constipated. She recently quit her job which was causing her a lot of stress, and had three weeks off before starting her new job. She explains that she is the heaviest she has ever been in weight and wonders if the pain is due to the excess weight in her abdomen.         Review of Systems   CONSTITUTIONAL:NEGATIVE for fever, chills, change in weight  INTEGUMENTARY/SKIN: NEGATIVE for worrisome rashes, moles or lesions  RESP:NEGATIVE for significant cough or SOB  CV: NEGATIVE for chest pain or palpitations. POSITIVE for peripheral edema  GI: POSITIVE for abdominal pain  : negative for, decreased urinary stream, dysuria and " hematuria  MUSCULOSKELETAL: NEGATIVE for significant arthralgias or myalgia  NEURO: NEGATIVE for weakness, dizziness or paresthesias  ENDOCRINE: NEGATIVE for temperature intolerance, skin/hair changes  PSYCHIATRIC: NEGATIVE for changes in mood or affect      Objective    BP (!) 158/100 (BP Location: Left arm, Patient Position: Left side, Cuff Size: Adult Large)   Pulse 80   Wt 133.4 kg (294 lb)   SpO2 98%   BMI 46.05 kg/m    Body mass index is 46.05 kg/m .     Physical Exam   GENERAL: alert and no distress  NECK: no adenopathy, no asymmetry, masses, or scars and thyroid normal to palpation  RESP: lungs clear to auscultation - no rales, rhonchi or wheezes  CV: regular rate and rhythm, normal S1 S2, no S3 or S4, no murmur, click or rub,+1  trace peripheral edema noted in the bilateral lower extremities and peripheral pulses strong  ABDOMEN: soft, tender in the left and right lower quadrents, no hepatosplenomegaly, no masses and bowel sounds normal  MS: no gross musculoskeletal defects noted, no edema  NEURO: Normal strength and tone, mentation intact and speech normal    No results found for this or any previous visit (from the past 24 hour(s)).    ----- Services Performed by a NURSE PRACTITIONER STUDENT in Presence of ATTENDING Physician-------        Answers for HPI/ROS submitted by the patient on 12/9/2021  If you checked off any problems, how difficult have these problems made it for you to do your work, take care of things at home, or get along with other people?: Not difficult at all  PHQ9 TOTAL SCORE: 1  JANETH 7 TOTAL SCORE: 4

## 2021-12-09 NOTE — ASSESSMENT & PLAN NOTE
Reports her asthma is well controlled, ACT score of 22. She reports the addition of Symbicort has been helpful in reducing her symptoms and that she rarely needs to use her albuterol. Continue current regimen and follow up in 6 months.

## 2021-12-10 LAB — HBA1C MFR BLD: 6.1 %

## 2021-12-10 ASSESSMENT — PATIENT HEALTH QUESTIONNAIRE - PHQ9: SUM OF ALL RESPONSES TO PHQ QUESTIONS 1-9: 1

## 2021-12-10 ASSESSMENT — ANXIETY QUESTIONNAIRES: GAD7 TOTAL SCORE: 4

## 2021-12-10 ASSESSMENT — ASTHMA QUESTIONNAIRES: ACT_TOTALSCORE: 22

## 2021-12-19 ENCOUNTER — HOSPITAL ENCOUNTER (OUTPATIENT)
Dept: CT IMAGING | Facility: CLINIC | Age: 51
Discharge: HOME | End: 2021-12-19
Attending: FAMILY MEDICINE | Admitting: FAMILY MEDICINE
Payer: COMMERCIAL

## 2021-12-19 DIAGNOSIS — R10.84 ABDOMINAL PAIN, GENERALIZED: ICD-10-CM

## 2021-12-19 PROCEDURE — 250N000011 HC RX IP 250 OP 636: Performed by: FAMILY MEDICINE

## 2021-12-19 PROCEDURE — 74177 CT ABD & PELVIS W/CONTRAST: CPT

## 2021-12-19 RX ORDER — IOPAMIDOL 755 MG/ML
100 INJECTION, SOLUTION INTRAVASCULAR ONCE
Status: COMPLETED | OUTPATIENT
Start: 2021-12-19 | End: 2021-12-19

## 2021-12-19 RX ADMIN — IOPAMIDOL 100 ML: 755 INJECTION, SOLUTION INTRAVENOUS at 13:18

## 2021-12-20 ENCOUNTER — MYC MEDICAL ADVICE (OUTPATIENT)
Dept: FAMILY MEDICINE | Facility: CLINIC | Age: 51
End: 2021-12-20
Payer: COMMERCIAL

## 2021-12-20 DIAGNOSIS — E66.01 MORBID OBESITY DUE TO EXCESS CALORIES (H): Primary | ICD-10-CM

## 2021-12-20 RX ORDER — SEMAGLUTIDE 0.25 MG/.5ML
INJECTION, SOLUTION SUBCUTANEOUS
Qty: 3 ML | Refills: 1 | Status: SHIPPED | OUTPATIENT
Start: 2021-12-20 | End: 2021-12-27

## 2021-12-20 NOTE — TELEPHONE ENCOUNTER
Discussed with patient and will start on a medication would start with Wegovy and follow-up in about a month.

## 2021-12-24 DIAGNOSIS — J45.40 MODERATE PERSISTENT ASTHMA WITHOUT COMPLICATION: ICD-10-CM

## 2021-12-27 ENCOUNTER — OFFICE VISIT (OUTPATIENT)
Dept: PODIATRY | Facility: CLINIC | Age: 51
End: 2021-12-27
Payer: COMMERCIAL

## 2021-12-27 VITALS — HEART RATE: 72 BPM | RESPIRATION RATE: 14 BRPM | TEMPERATURE: 98.2 F

## 2021-12-27 DIAGNOSIS — M21.6X2 PRONATION DEFORMITY OF BOTH FEET: ICD-10-CM

## 2021-12-27 DIAGNOSIS — M20.10 HALLUX VALGUS, UNSPECIFIED LATERALITY: Primary | ICD-10-CM

## 2021-12-27 DIAGNOSIS — M21.6X1 PRONATION DEFORMITY OF BOTH FEET: ICD-10-CM

## 2021-12-27 PROCEDURE — 99213 OFFICE O/P EST LOW 20 MIN: CPT | Performed by: PODIATRIST

## 2021-12-27 RX ORDER — BUDESONIDE AND FORMOTEROL FUMARATE DIHYDRATE 160; 4.5 UG/1; UG/1
AEROSOL RESPIRATORY (INHALATION)
Qty: 10.2 G | Refills: 4 | OUTPATIENT
Start: 2021-12-27

## 2021-12-27 RX ORDER — BUPROPION HYDROCHLORIDE 150 MG/1
150 TABLET ORAL EVERY MORNING
Qty: 90 TABLET | Refills: 0 | Status: SHIPPED | OUTPATIENT
Start: 2021-12-27 | End: 2022-02-28

## 2021-12-27 ASSESSMENT — PAIN SCALES - GENERAL: PAINLEVEL: MODERATE PAIN (4)

## 2021-12-27 NOTE — TELEPHONE ENCOUNTER
"Script sent 12/09/21.  Pt should have refills on file. Same pharmacy.    Last Written Prescription Date:  12/09/2021  Last Fill Quantity: 10.2 g,  # refills: 4   Last office visit provider:   12/09/2021 with Dr Liang.    Requested Prescriptions   Pending Prescriptions Disp Refills     budesonide-formoterol (SYMBICORT) 160-4.5 MCG/ACT Inhaler [Pharmacy Med Name: SYMBICORT 160/4.5MCG (120 ORAL INH)] 10.2 g 4     Sig: INHALE 2 PUFFS BY MOUTH TWICE DAILY       Inhaled Steroids Protocol Passed - 12/24/2021  3:30 AM        Passed - Patient is age 12 or older        Passed - Asthma control assessment score within normal limits in last 6 months     Please review ACT score.           Passed - Medication is active on med list        Passed - Recent (6 mo) or future (30 days) visit within the authorizing provider's specialty     Patient had office visit in the last 6 months or has a visit in the next 30 days with authorizing provider or within the authorizing provider's specialty.  See \"Patient Info\" tab in inbasket, or \"Choose Columns\" in Meds & Orders section of the refill encounter.           Long-Acting Beta Agonist Inhalers Protocol  Passed - 12/24/2021  3:30 AM        Passed - Patient is age 12 or older        Passed - Asthma control assessment score within normal limits in last 6 months     Please review ACT score.           Passed - Medication is active on med list        Passed - Recent (6 mo) or future (30 days) visit within the authorizing provider's specialty     Patient had office visit in the last 6 months or has a visit in the next 30 days with authorizing provider or within the authorizing provider's specialty.  See \"Patient Info\" tab in inbasket, or \"Choose Columns\" in Meds & Orders section of the refill encounter.                 Ann Julian 12/27/21 4:20 PM  "

## 2021-12-27 NOTE — PROGRESS NOTES
FOOT AND ANKLE SURGERY/PODIATRY Progress Note        ASSESSMENT:   Pronation deformity bilateral feet  Hallux abductovalgus left foot    HPI: Annabella Guillen was seen again today presented to the clinic today complaining of a painful bunion left foot.  The patient stated the pain can be quite severe at times.  She has to sit and remove her shoes and after 20 minutes her pain improves.  She denies any trauma to her foot.  She has not had any redness or swelling.  She described as a sharp pain.  She denies any other previous treatment.  The patient also complained of painful flatfeet.  She did have an MRI of her left ankle taken.  MRI did show that she does have some tenosynovitis of the flexor digitorum longus and the posterior tibial tendon.     Past Medical History:   Diagnosis Date     Asthma      Essential hypertension, benign 5/3/2021     NO ACTIVE PROBLEMS      Syncope 2005    etiology unknown        Past Surgical History:   Procedure Laterality Date     HC REDUCTION OF LARGE BREAST       HC REDUCTION OF LARGE BREAST      Description: Breast Surgery Reduction Procedure;  Recorded: 02/18/2014;     HC REMOVAL OF TONSILS,<11 Y/O      Description: Tonsillectomy;  Recorded: 02/18/2014;     MAMMOPLASTY REDUCTION Bilateral 2000     SINUS SURGERY         Allergies   Allergen Reactions     Amoxicillin-Pot Clavulanate      Augmentin     Penicillins Nausea and Vomiting         Current Outpatient Medications:      albuterol (PROAIR HFA) 108 (90 Base) MCG/ACT inhaler, Inhale 2 puffs into the lungs every 4 hours as needed for shortness of breath / dyspnea or wheezing, Disp: 18 g, Rfl: 3     budesonide-formoterol (SYMBICORT) 160-4.5 MCG/ACT Inhaler, INHALE 2 PUFFS BY MOUTH TWICE DAILY, Disp: 10.2 g, Rfl: 4     cetirizine HCl (ZYRTEC ALLERGY) 10 MG CAPS, Take 10 mg by mouth daily, Disp: , Rfl:      lisinopril (ZESTRIL) 10 MG tablet, Take 10 mg by mouth, Disp: , Rfl:      lisinopril-hydrochlorothiazide (ZESTORETIC) 10-12.5 MG  tablet, Take 1 tablet by mouth daily, Disp: 90 tablet, Rfl: 0     omeprazole (PRILOSEC) 40 MG capsule, Take 40 mg by mouth daily, Disp: , Rfl:     Family History   Problem Relation Age of Onset     Coronary Artery Disease Mother      Hypertension Mother      Asthma Mother      Atrial fibrillation Mother      Depression Mother      Osteoporosis Mother      Obesity Mother      Diabetes Sister      Other Cancer Sister         Uterine     Substance Abuse Sister      Obesity Sister      Asthma Son      Ovarian Cancer Sister      Other Cancer Sister         Ovarian     Depression Sister      Anxiety Disorder Sister      Obesity Sister      Endometrial Cancer Sister      Hypertension Father      Prostate Cancer Father      Depression Father      Substance Abuse Father      Obesity Father      Diabetes Maternal Grandmother      Colon Cancer Cousin        Social History     Socioeconomic History     Marital status:      Spouse name: Not on file     Number of children: Not on file     Years of education: Not on file     Highest education level: Not on file   Occupational History     Occupation: Nurse   Tobacco Use     Smoking status: Former Smoker     Packs/day: 0.25     Years: 10.00     Pack years: 2.50     Types: Cigarettes     Quit date: 1/1/2011     Years since quitting: 10.9     Smokeless tobacco: Never Used     Tobacco comment: quit 2000   Substance and Sexual Activity     Alcohol use: Yes     Alcohol/week: 2.0 standard drinks     Comment: Alcoholic Drinks/day: rare     Drug use: Never     Sexual activity: Yes     Partners: Male     Birth control/protection: Pull-out method   Other Topics Concern     Parent/sibling w/ CABG, MI or angioplasty before 65F 55M? No   Social History Narrative    Education: Master's in nursing        Children: Geremias 21, Adrianne 25, Lucy 15, Fuentes 12     Social Determinants of Health     Financial Resource Strain: Not on file   Food Insecurity: Not on file   Transportation Needs: Not  on file   Physical Activity: Not on file   Stress: Not on file   Social Connections: Not on file   Intimate Partner Violence: Not on file   Housing Stability: Not on file       10 point Review of Systems is negative     Pulse 72   Temp 98.2  F (36.8  C)   Resp 14     BMI= There is no height or weight on file to calculate BMI.    OBJECTIVE:  General appearance: Patient is alert and fully cooperative with history & exam.  No sign of distress is noted during the visit.  Vascular: Dorsalis pedis and posterior tibial pulses are palpable. There is no pedal hair growth bilaterally.  CFT < 3 sec from anterior tibial surface to distal digits bilaterally. There is  appreciable edema noted left ankle.  Dermatologic: Turgor and texture are within normal limits. No coloration or temperature changes. No primary or secondary lesions noted.  Neurologic: All epicritic and proprioceptive sensations are grossly intact bilaterally.  Musculoskeletal: All active and passive ankle, subtalar, midtarsal joint range of motion are grossly intact without pain or crepitus, with the exception of the first MPJ left foot. Manual muscle strength is within normal limits bilaterally. All dorsiflexors, plantarflexors, invertors, evertors are intact bilaterally. Tenderness present to the first MPJ left foot on palpation.  No tenderness to the left ankle with range of motion.  There is a large raised palpable subcutaneous mass on the medial aspect of the head of the first metatarsal left foot.  There is severe flattening of the medial longitudinal arch noted bilaterally.  Calf is soft/non-tender without warmth/induration    Imaging:         CT Abdomen Pelvis w Contrast    Result Date: 12/19/2021  EXAM: CT ABDOMEN PELVIS W CONTRAST LOCATION: [facility] DATE/TIME: 12/19/2021 1:07 PM INDICATION: Abdominal pain, hernia suspected COMPARISON: None. TECHNIQUE: CT scan of the abdomen and pelvis was performed following injection of IV  contrast. Multiplanar reformats were obtained. Dose reduction techniques were used. CONTRAST: ISOVUE 370 100mL FINDINGS: LOWER CHEST: Normal. HEPATOBILIARY: Mild diffuse hepatic steatosis. PANCREAS: Normal. SPLEEN: Normal. ADRENAL GLANDS: Normal. KIDNEYS/BLADDER: Normal. BOWEL: Normal bowel loops including normal appendix. No hernias. LYMPH NODES: Normal. VASCULATURE: Unremarkable. PELVIC ORGANS: Normal. MUSCULOSKELETAL: Normal.     IMPRESSION: 1.  No significant findings to explain the patient's pain. 2.  Mild diffuse hepatic steatosis. 3.  No hernias.           TREATMENT:  I have recommended orthotics.  I informed the patient she will require surgical correction of her painful bunion deformity.  The patient stated she would like to have this procedure done in March 2022.        Azael Faye; MILLER  [facility] Foot & Ankle Surgery/Podiatry

## 2021-12-27 NOTE — PATIENT INSTRUCTIONS
What are Prescription Custom Orthotics?  Custom orthotics are specially-made devices designed to support and comfort your feet. Prescription orthotics are crafted for you and no one else. They match the contours of your feet precisely and are designed for the way you move. Orthotics are only manufactured after a podiatrist has conducted a complete evaluation of your feet, ankles, and legs, so the orthotic can accommodate your unique foot structure and pathology.  Prescription orthotics are divided into two categories:    Functional orthotics are designed to control abnormal motion. They may be used to treat foot pain caused by abnormal motion; they can also be used to treat injuries such as shin splints or tendinitis. Functional orthotics are usually crafted of a semi-rigid material such as plastic or graphite.    Accommodative orthotics are softer and meant to provide additional cushioning and support. They can be used to treat diabetic foot ulcers, painful calluses on the bottom of the foot, and other uncomfortable conditions.  Podiatrists use orthotics to treat foot problems such as plantar fasciitis, bursitis, tendinitis, diabetic foot ulcers, and foot, ankle, and heel pain. Clinical research studies have shown that podiatrist-prescribed foot orthotics decrease foot pain and improve function.  Orthotics typically cost more than shoe inserts purchased in a retail store, but the additional cost is usually well worth it. Unlike shoe inserts, orthotics are molded to fit each individual foot, so you can be sure that your orthotics fit and do what they're supposed to do. Prescription orthotics are also made of top-notch materials and last many years when cared for properly. Insurance often helps pay for prescription orthotics.  What are Shoe Inserts?   You've seen them at the grocery store and at the mall. You've probably even seen them on TV and online. Shoe inserts are any kind of non-prescription foot support  designed to be worn inside a shoe. Pre-packaged, mass produced, arch supports are shoe inserts. So are the  custom-made  insoles and foot supports that you can order online or at retail stores. Unless the device has been prescribed by a doctor and crafted for your specific foot, it's a shoe insert, not a custom orthotic device--despite what the ads might say.  Shoe inserts can be very helpful for a variety of foot ailments, including flat arches and foot and leg pain. They can cushion your feet, provide comfort, and support your arches, but they can't correct biomechanical foot problems or cure long-standing foot issues.  The most common types of shoe inserts are:    Arch supports: Some people have high arches. Others have low arches or flat feet. Arch supports generally have a  bumped-up  appearance and are designed to support the foot's natural arch.     Insoles: Insoles slip into your shoe to provide extra cushioning and support. Insoles are often made of gel, foam, or plastic.     Heel liners: Heel liners, sometimes called heel pads or heel cups, provide extra cushioning in the heel region. They may be especially useful for patients who have foot pain caused by age-related thinning of the heels' natural fat pads.     Foot cushions: Do your shoes rub against your heel or your toes? Foot cushions come in many different shapes and sizes and can be used as a barrier between you and your shoe.  Choosing an Over-the-Counter Shoe Insert  Selecting a shoe insert from the wide variety of devices on the market can be overwhelming. Here are some podiatrist-tested tips to help you find the insert that best meets your needs:    Consider your health. Do you have diabetes? Problems with circulation? An over-the-counter insert may not be your best bet. Diabetes and poor circulation increase your risk of foot ulcers and infections, so schedule an appointment with a podiatrist. He or she can help you select a solution that won't  cause additional health problems.     Think about the purpose. Are you planning to run a marathon, or do you just need a little arch support in your work shoes? Look for a product that fits your planned level of activity.     Bring your shoes. For the insert to be effective, it has to fit into your shoes. So bring your sneakers, dress shoes, or work boots--whatever you plan to wear with your insert. Look for an insert that will fit the contours of your shoe.     Try them on. If all possible, slip the insert into your shoe and try it out. Walk around a little. How does it feel? Don't assume that feelings of pressure will go away with continued wear. (If you can't try the inserts at the store, ask about the store's return policy and hold on to your receipt.)    Please call one of the [place] locations below to schedule an appointment. If you received a prescription please bring it with you to your appointment. Some locations are limited to what they carry.    Office Locations    [facility]  [address] [place] [address]  Home Medical Equipment, [address]   Phone: [phone]   Orthotics and Prosthetics, [address]   Phone: [phone]    [facility]  Home Medical Equipment  [address], [place] [address]   Phone: [phone]    Orthotics and Prosthetics ([facility])    [address], [address], [place] [address]  Phone: [phone]    [facility]  [address]   [place] [address]   Phone: [phone]    [facility]ld[facility]  [address]  [place] [address]  Phone: [phone]    [facility] Bldg[facility]   [address]  [place] [address]  Phone: [phone]    [facility]  [address]  [place] [address]  Phone: [phone]    [facility]  [address]  [place] [address]  Phone: [phone]    [address] [place] [address]   Phone: [phone]

## 2021-12-27 NOTE — LETTER
12/27/2021         RE: Annabella Guillen  [address] [place] [address]        Dear Colleague,    Thank you for referring your patient, Annabella Guillen, to the [facility]. Please see a copy of my visit note below.    FOOT AND ANKLE SURGERY/PODIATRY Progress Note        ASSESSMENT:   Pronation deformity bilateral feet  Hallux abductovalgus left foot    HPI: Annabella Guillen was seen again today presented to the clinic today complaining of a painful bunion left foot.  The patient stated the pain can be quite severe at times.  She has to sit and remove her shoes and after 20 minutes her pain improves.  She denies any trauma to her foot.  She has not had any redness or swelling.  She described as a sharp pain.  She denies any other previous treatment.  The patient also complained of painful flatfeet.  She did have an MRI of her left ankle taken.  MRI did show that she does have some tenosynovitis of the flexor digitorum longus and the posterior tibial tendon.     Past Medical History:   Diagnosis Date     Asthma      Essential hypertension, benign 5/3/2021     NO ACTIVE PROBLEMS      Syncope 2005    etiology unknown        Past Surgical History:   Procedure Laterality Date     HC REDUCTION OF LARGE BREAST       HC REDUCTION OF LARGE BREAST      Description: Breast Surgery Reduction Procedure;  Recorded: 02/18/2014;     HC REMOVAL OF TONSILS,<11 Y/O      Description: Tonsillectomy;  Recorded: 02/18/2014;     MAMMOPLASTY REDUCTION Bilateral 2000     SINUS SURGERY         Allergies   Allergen Reactions     Amoxicillin-Pot Clavulanate      Augmentin     Penicillins Nausea and Vomiting         Current Outpatient Medications:      albuterol (PROAIR HFA) 108 (90 Base) MCG/ACT inhaler, Inhale 2 puffs into the lungs every 4 hours as needed for shortness of breath / dyspnea or wheezing, Disp: 18 g, Rfl: 3     budesonide-formoterol (SYMBICORT) 160-4.5 MCG/ACT Inhaler, INHALE 2 PUFFS BY MOUTH TWICE  DAILY, Disp: 10.2 g, Rfl: 4     cetirizine HCl (ZYRTEC ALLERGY) 10 MG CAPS, Take 10 mg by mouth daily, Disp: , Rfl:      lisinopril (ZESTRIL) 10 MG tablet, Take 10 mg by mouth, Disp: , Rfl:      lisinopril-hydrochlorothiazide (ZESTORETIC) 10-12.5 MG tablet, Take 1 tablet by mouth daily, Disp: 90 tablet, Rfl: 0     omeprazole (PRILOSEC) 40 MG capsule, Take 40 mg by mouth daily, Disp: , Rfl:     Family History   Problem Relation Age of Onset     Coronary Artery Disease Mother      Hypertension Mother      Asthma Mother      Atrial fibrillation Mother      Depression Mother      Osteoporosis Mother      Obesity Mother      Diabetes Sister      Other Cancer Sister         Uterine     Substance Abuse Sister      Obesity Sister      Asthma Son      Ovarian Cancer Sister      Other Cancer Sister         Ovarian     Depression Sister      Anxiety Disorder Sister      Obesity Sister      Endometrial Cancer Sister      Hypertension Father      Prostate Cancer Father      Depression Father      Substance Abuse Father      Obesity Father      Diabetes Maternal Grandmother      Colon Cancer Cousin        Social History     Socioeconomic History     Marital status:      Spouse name: Not on file     Number of children: Not on file     Years of education: Not on file     Highest education level: Not on file   Occupational History     Occupation: Nurse   Tobacco Use     Smoking status: Former Smoker     Packs/day: 0.25     Years: 10.00     Pack years: 2.50     Types: Cigarettes     Quit date: 1/1/2011     Years since quitting: 10.9     Smokeless tobacco: Never Used     Tobacco comment: quit 2000   Substance and Sexual Activity     Alcohol use: Yes     Alcohol/week: 2.0 standard drinks     Comment: Alcoholic Drinks/day: rare     Drug use: Never     Sexual activity: Yes     Partners: Male     Birth control/protection: Pull-out method   Other Topics Concern     Parent/sibling w/ CABG, MI or angioplasty before 65F 55M? No    Social History Narrative    Education: Master's in nursing        Children: Geremias 21, Adrianne 25, Lucy 15, Fuentes 12     Social Determinants of Health     Financial Resource Strain: Not on file   Food Insecurity: Not on file   Transportation Needs: Not on file   Physical Activity: Not on file   Stress: Not on file   Social Connections: Not on file   Intimate Partner Violence: Not on file   Housing Stability: Not on file       10 point Review of Systems is negative     Pulse 72   Temp 98.2  F (36.8  C)   Resp 14     BMI= There is no height or weight on file to calculate BMI.    OBJECTIVE:  General appearance: Patient is alert and fully cooperative with history & exam.  No sign of distress is noted during the visit.  Vascular: Dorsalis pedis and posterior tibial pulses are palpable. There is no pedal hair growth bilaterally.  CFT < 3 sec from anterior tibial surface to distal digits bilaterally. There is  appreciable edema noted left ankle.  Dermatologic: Turgor and texture are within normal limits. No coloration or temperature changes. No primary or secondary lesions noted.  Neurologic: All epicritic and proprioceptive sensations are grossly intact bilaterally.  Musculoskeletal: All active and passive ankle, subtalar, midtarsal joint range of motion are grossly intact without pain or crepitus, with the exception of the first MPJ left foot. Manual muscle strength is within normal limits bilaterally. All dorsiflexors, plantarflexors, invertors, evertors are intact bilaterally. Tenderness present to the first MPJ left foot on palpation.  No tenderness to the left ankle with range of motion.  There is a large raised palpable subcutaneous mass on the medial aspect of the head of the first metatarsal left foot.  There is severe flattening of the medial longitudinal arch noted bilaterally.  Calf is soft/non-tender without warmth/induration    Imaging:         CT Abdomen Pelvis w Contrast    Result Date:  12/19/2021  EXAM: CT ABDOMEN PELVIS W CONTRAST LOCATION: [facility] DATE/TIME: 12/19/2021 1:07 PM INDICATION: Abdominal pain, hernia suspected COMPARISON: None. TECHNIQUE: CT scan of the abdomen and pelvis was performed following injection of IV contrast. Multiplanar reformats were obtained. Dose reduction techniques were used. CONTRAST: ISOVUE 370 100mL FINDINGS: LOWER CHEST: Normal. HEPATOBILIARY: Mild diffuse hepatic steatosis. PANCREAS: Normal. SPLEEN: Normal. ADRENAL GLANDS: Normal. KIDNEYS/BLADDER: Normal. BOWEL: Normal bowel loops including normal appendix. No hernias. LYMPH NODES: Normal. VASCULATURE: Unremarkable. PELVIC ORGANS: Normal. MUSCULOSKELETAL: Normal.     IMPRESSION: 1.  No significant findings to explain the patient's pain. 2.  Mild diffuse hepatic steatosis. 3.  No hernias.           TREATMENT:  I have recommended orthotics.  I informed the patient she will require surgical correction of her painful bunion deformity.  The patient stated she would like to have this procedure done in March 2022.        Azael Terry DPM  [facility] Foot & Ankle Surgery/Podiatry           Again, thank you for allowing me to participate in the care of your patient.        Sincerely,        Azael Faey DPM

## 2021-12-27 NOTE — TELEPHONE ENCOUNTER
Patient is calling to report the medication Wegovy is not available at the pharmacy. Would like try Wellbutrin, as she has used it in the past.  SEND TO [facility] [address] [facility],

## 2022-02-24 ENCOUNTER — HOSPITAL ENCOUNTER (OUTPATIENT)
Dept: PHYSICAL THERAPY | Facility: REHABILITATION | Age: 52
End: 2022-02-24
Attending: FAMILY MEDICINE
Payer: COMMERCIAL

## 2022-02-24 DIAGNOSIS — G89.29 CHRONIC BILATERAL LOW BACK PAIN WITHOUT SCIATICA: ICD-10-CM

## 2022-02-24 DIAGNOSIS — M62.81 MUSCLE WEAKNESS (GENERALIZED): Primary | ICD-10-CM

## 2022-02-24 DIAGNOSIS — M54.50 CHRONIC BILATERAL LOW BACK PAIN WITHOUT SCIATICA: ICD-10-CM

## 2022-02-24 PROCEDURE — 97161 PT EVAL LOW COMPLEX 20 MIN: CPT | Mod: GP | Performed by: PHYSICAL THERAPIST

## 2022-02-24 PROCEDURE — 97112 NEUROMUSCULAR REEDUCATION: CPT | Mod: GP | Performed by: PHYSICAL THERAPIST

## 2022-02-24 NOTE — DISCHARGE INSTRUCTIONS
Start with     Ab squeezes - try to hold x5 seconds x10-20 reps 2-4x/day    Glut squeezes - hold x5 seconds x10-20 reps 2-4x/day - decrease effort so comfortable on tailbone    Attempt pillow under low back (small) and under knees to see if that helps with back spasms at night - can try in bed or recliner

## 2022-02-27 NOTE — PROGRESS NOTES
02/24/22 1600   General Information   Type of Visit Initial OP Ortho PT Evaluation   Start of Care Date 02/24/22   Referring Physician Dr Danica Liang   Orders Evaluate and Treat   Date of Order 12/09/22   Medical Diagnosis Chronic bilateral low back pain without sciatica M54.50, G89.29    Surgical/Medical history reviewed Yes   Precautions/Limitations no known precautions/limitations   General Information Comments Pt also got shingles on R side of low back Sept 2020 - thought initially her pain was maybe due to the shingles. Pt also is going to start wearing orthotics and tennis shoes versus dankso.   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 Pt since COVID started she has maybe gained about 70 lbs. 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 Pt has since not gotten much relief or change of her pain. Pt is taking ibuprofen on a regular basis to be able to get out of bed and function for her day. In November of 2021 pt changed her job position so now she has more manageable hours but is visiting homes so sometimes she has difficult terrain to walk through. Pain can bother across low back and can radiate down into her R thigh and sometimes will bother into left shoulder blade. Pt has liked to do walking and yoga in the past but doesn't do well with walking the dog in the winter. Pt reports she has gained 70 lb since the beginning of COVID and is wanting to lose weight and may try to start back up with a food program.    Impairments A. Pain;D. Decreased ROM;E.  Decreased flexibility;F. Decreased strength and endurance   Functional Limitations perform activities of daily living;perform required work activities;perform desired leisure / sports activities   Symptom Location low back   How/Where did it occur From insidious onset   Onset date of current episode/exacerbation 09/01/20   Chronicity Chronic   Pain rating (0-10 point scale) Other   Pain rating comment 0-8/10 - pt reports up to severe pain that she has been unable to get out of bed without needing ibuprofen   Progression of symptoms since onset: Unchanged   Fall Risk Screen   Fall screen completed by PT   Have you fallen 2 or more times in the past year? No   Have you fallen and had an injury in the past year? No   Fall screen comments Fell on ice about 2 months ago with some R wrist pain that resolved   Abuse Screen (yes response referral indicated)   Feels Unsafe at Home or Work/School no   Feels Threatened by Someone no   Does Anyone Try to Keep You From Having Contact with Others or Doing Things Outside Your Home? no   Physical Signs of Abuse Present no   Lumbar Spine/SI Objective Findings   Observation Pt reported increased weight gain with increasing significant lumbar lordosis positioning   Gait/Locomotion B trendelenberg    Flexion ROM WNL without pain   Extension ROM Mod with increased pain central low back   Right Side Bending ROM Min limited with increase pain R side   Left Side Bending ROM WNL with pain on right side   Repeated Extension-Standing ROM Mild sensitivity increase into R buttock   Transversus Abdominus Strength (David Leg Lowering-deg) Decreased back pain with TA iso seated   Hip Flexion (L2) Strength B 4/5 with pain R side   Hip Extension Strength Increased tailbone pain with glut iso    Ankle Dorsiflexion (L4) Strength B 5/5   Hamstring Flexibility WNL   Hip Flexor Flexibility Limited B   Planned Therapy Interventions   Planned Therapy Interventions bed mobility training;ADL  retraining;joint mobilization;manual therapy;neuromuscular re-education;ROM;strengthening;stretching   Clinical Impression   PT Diagnosis Chronic low back pain with R leg pain with decreased trunk ROM and trunk/core strength   Clinical Presentation Stable/Uncomplicated   Clinical Decision Making (Complexity) Low complexity   Therapy Frequency 1 time/week   Predicted Duration of Therapy Intervention (days/wks) 12 weeks   Risk & Benefits of therapy have been explained Yes   Patient, Family & other staff in agreement with plan of care Yes   Clinical Impression Comments Limited evalution due to time constraints but from assessment so far pt demo's signs and sx consistent with chronic low back pain with decreased ROM in pattern for facet irritation R side, decreased strength core/trunk and change with body composition causing difficulty with ADLs, functional mobility and especially with sleep. Pt is good candidate for skilled PT services to decrease impairments and reach goals.    ORTHO GOALS   PT Ortho Eval Goals 1;2;3   Ortho Goal 1   Goal Description Pt will be able to sleep in bed without increase in back pain waking pt up in 12 weeks for improved QOL.   Target Date 05/19/22   Ortho Goal 2   Goal Description Pt will be able to walk in/out pt's homes without back pain or difficulty in 12 weeks for improved ability to perform job.    Target Date 05/19/22   Ortho Goal 3   Goal Description Pt will be able to perform ADLs and functional mobility without needing OTC pain medication with minimal low back pain for improved QOL in 12 weeks.   Target Date 05/19/22   Total Evaluation Time   PT Eval, Low Complexity Minutes (62818) 20

## 2022-02-28 ENCOUNTER — HOSPITAL ENCOUNTER (OUTPATIENT)
Dept: PHYSICAL THERAPY | Facility: REHABILITATION | Age: 52
End: 2022-02-28
Payer: COMMERCIAL

## 2022-02-28 ENCOUNTER — OFFICE VISIT (OUTPATIENT)
Dept: FAMILY MEDICINE | Facility: CLINIC | Age: 52
End: 2022-02-28
Payer: COMMERCIAL

## 2022-02-28 VITALS
BODY MASS INDEX: 46.2 KG/M2 | SYSTOLIC BLOOD PRESSURE: 118 MMHG | DIASTOLIC BLOOD PRESSURE: 74 MMHG | HEART RATE: 90 BPM | OXYGEN SATURATION: 98 % | WEIGHT: 293 LBS

## 2022-02-28 DIAGNOSIS — M62.81 MUSCLE WEAKNESS (GENERALIZED): ICD-10-CM

## 2022-02-28 DIAGNOSIS — G89.29 CHRONIC BILATERAL LOW BACK PAIN WITHOUT SCIATICA: Primary | ICD-10-CM

## 2022-02-28 DIAGNOSIS — E66.01 MORBID OBESITY DUE TO EXCESS CALORIES (H): ICD-10-CM

## 2022-02-28 DIAGNOSIS — M54.50 CHRONIC BILATERAL LOW BACK PAIN WITHOUT SCIATICA: Primary | ICD-10-CM

## 2022-02-28 DIAGNOSIS — M54.16 LUMBAR RADICULOPATHY: ICD-10-CM

## 2022-02-28 DIAGNOSIS — F41.1 GAD (GENERALIZED ANXIETY DISORDER): Primary | ICD-10-CM

## 2022-02-28 PROCEDURE — 97112 NEUROMUSCULAR REEDUCATION: CPT | Mod: GP | Performed by: PHYSICAL THERAPIST

## 2022-02-28 PROCEDURE — 99214 OFFICE O/P EST MOD 30 MIN: CPT | Performed by: FAMILY MEDICINE

## 2022-02-28 PROCEDURE — 97110 THERAPEUTIC EXERCISES: CPT | Mod: GP | Performed by: PHYSICAL THERAPIST

## 2022-02-28 RX ORDER — ESCITALOPRAM OXALATE 10 MG/1
10 TABLET ORAL DAILY
Qty: 30 TABLET | Refills: 1 | Status: SHIPPED | OUTPATIENT
Start: 2022-02-28 | End: 2022-04-05

## 2022-02-28 NOTE — DISCHARGE INSTRUCTIONS
Continue with ab squeeze and glut squeeze to support low back when sensitive    ADD in    Pelvic clock      12 to 6 tip back and forth x5-10 reps  Try 3-9 oclock x5-10 reps   Try clockwise and counter clockwise x5-10 reps  1-2x/day      Gently side to side x5-10 reps - just as far as feels good  1-2x/day      x5-10 reps       x10-20 reps with band around knees 1-2x/day

## 2022-03-01 ASSESSMENT — ANXIETY QUESTIONNAIRES
6. BECOMING EASILY ANNOYED OR IRRITABLE: SEVERAL DAYS
3. WORRYING TOO MUCH ABOUT DIFFERENT THINGS: MORE THAN HALF THE DAYS
IF YOU CHECKED OFF ANY PROBLEMS ON THIS QUESTIONNAIRE, HOW DIFFICULT HAVE THESE PROBLEMS MADE IT FOR YOU TO DO YOUR WORK, TAKE CARE OF THINGS AT HOME, OR GET ALONG WITH OTHER PEOPLE: SOMEWHAT DIFFICULT
7. FEELING AFRAID AS IF SOMETHING AWFUL MIGHT HAPPEN: NOT AT ALL
5. BEING SO RESTLESS THAT IT IS HARD TO SIT STILL: SEVERAL DAYS
GAD7 TOTAL SCORE: 9
2. NOT BEING ABLE TO STOP OR CONTROL WORRYING: MORE THAN HALF THE DAYS
1. FEELING NERVOUS, ANXIOUS, OR ON EDGE: SEVERAL DAYS

## 2022-03-01 ASSESSMENT — PATIENT HEALTH QUESTIONNAIRE - PHQ9
SUM OF ALL RESPONSES TO PHQ QUESTIONS 1-9: 12
5. POOR APPETITE OR OVEREATING: MORE THAN HALF THE DAYS

## 2022-03-01 NOTE — PROGRESS NOTES
Problem List Items Addressed This Visit        Digestive    Morbid obesity due to excess calories (H)      Other Visit Diagnoses     JANETH (generalized anxiety disorder)    -  Primary    Relevant Medications    escitalopram (LEXAPRO) 10 MG tablet    Lumbar radiculopathy        Relevant Medications    escitalopram (LEXAPRO) 10 MG tablet        We had a good conversation today regarding many of her concerns.  I explained to the patient that we really need to set forward some health goals and then work on those consistently with the realization that will not solve them overnight.  I am glad that she is in physical therapy regarding her back and I do feel optimistic that will help.  I also am glad that ibuprofen seems to be effective and helpful at managing the pain.  We discussed that her mental health is probably a significant contributor to her weight issues and I recommended really focusing on that over the next few months.  I strongly advised her to find a psychotherapist and also talked about Al-Anon is a good resource considering her 's medical dependency issues.  I prescribed Lexapro 10 mg daily to assist with what I do think is generalized anxiety disorder she is often in a state of significant stress and has been for years.  We talked about weight management today as well and I do think she is a good candidate for surgical weight management.  However, a GLP-1 agonist could also be a good option but the changes needed to apply improvement in nutrition, meal planning and exercise would be very challenging right now considering some of her physical limitations as well as her stress level.  Follow-up in 1 month.    Return in about 4 weeks (around 3/28/2022) for recheck.    FRANCIE BYNUM    Joel Winchester is a 51 year old who presents today for a conversation regarding a few issues.  The patient has completed a physical therapy session regarding her back.  This was her second session and although  she has not had any relief yet, she is optimistic that this is going to help.  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  She is finding that her back does hurt quite a bit at work and by the end of the day.  She occasionally will have pain that radiates into her right leg all the way down to her foot.  The patient does find that ibuprofen is quite effective and she has been taking that twice daily to manage her pain.  He also complains of other pain issues including foot pain and knee pain at times.  Patient also has issues with pain at the base of both thumbs.  The patient also continues to complain of a significant amount of stress in her life.  He mentions today that her  struggled with chemical dependency and specifically alcoholism.  He was at [place] receiving treatment this summer but recently has relapsed.  This is a significant cause of stress in her life.  Also, she is finding that learning his new job is a bit stressful especially with some of her pain issues.  The patient has a history of morbid obesity with a BMI over 40 and find that her weight affects her mental health as well as well as her mobility and pain issues.  She is frustrated as she feels that all these issues are interdependent and she does not see a clear path of how to solve them.  She describes feeling agitated much of the time and anxious.  She always does like she has a lot going on and a lot of her shoulders.  Recently she has considered whether her marriage is where it should be but she lost her  but finds that his chemical dependency issues that are very stressful.  She has not been successful as of yet to connect with a psychotherapist.  However, she does feel that that would be helpful.     Objective    /74 (BP Location: Left arm, Patient Position: Left side, Cuff Size: Adult Large)   Pulse 90   Wt 133.8 kg (295 lb)   SpO2 98%   BMI 46.20 kg/m    Body mass index is 46.2  kg/m .  Physical Exam   GENERAL: healthy, alert and no distress

## 2022-03-02 ASSESSMENT — ANXIETY QUESTIONNAIRES: GAD7 TOTAL SCORE: 9

## 2022-03-07 ENCOUNTER — HOSPITAL ENCOUNTER (OUTPATIENT)
Dept: PHYSICAL THERAPY | Facility: REHABILITATION | Age: 52
End: 2022-03-07
Payer: COMMERCIAL

## 2022-03-07 DIAGNOSIS — M62.81 MUSCLE WEAKNESS (GENERALIZED): ICD-10-CM

## 2022-03-07 DIAGNOSIS — M54.50 CHRONIC BILATERAL LOW BACK PAIN WITHOUT SCIATICA: Primary | ICD-10-CM

## 2022-03-07 DIAGNOSIS — G89.29 CHRONIC BILATERAL LOW BACK PAIN WITHOUT SCIATICA: Primary | ICD-10-CM

## 2022-03-07 PROCEDURE — 97110 THERAPEUTIC EXERCISES: CPT | Mod: GP | Performed by: PHYSICAL THERAPIST

## 2022-03-07 PROCEDURE — 97535 SELF CARE MNGMENT TRAINING: CPT | Mod: GP | Performed by: PHYSICAL THERAPIST

## 2022-03-07 NOTE — DISCHARGE INSTRUCTIONS
Changes to home exercise program    Can change buttock squeeze to bridge exercise    x5-10 reps 1-2x/day      Clamshell x5-10 reps 1-2x/day - when tired can go back to laying on your back and doing hip abduction with band around knees      Keep abs tight x5-10 reps 1-2x/day - if feeling sensitive to load of leg can change it to knee bent and march instead of straight leg raise    Continue with stretching with lower trunk rotation knees side to side, new nerve glide/leg kick, pelvic clock, stomach squeeze to help when feeling back pain

## 2022-03-21 ENCOUNTER — HOSPITAL ENCOUNTER (OUTPATIENT)
Dept: PHYSICAL THERAPY | Facility: REHABILITATION | Age: 52
Discharge: HOME | End: 2022-03-21
Payer: COMMERCIAL

## 2022-03-21 DIAGNOSIS — M62.81 MUSCLE WEAKNESS (GENERALIZED): ICD-10-CM

## 2022-03-21 DIAGNOSIS — M54.50 CHRONIC BILATERAL LOW BACK PAIN WITHOUT SCIATICA: Primary | ICD-10-CM

## 2022-03-21 DIAGNOSIS — G89.29 CHRONIC BILATERAL LOW BACK PAIN WITHOUT SCIATICA: Primary | ICD-10-CM

## 2022-03-21 PROCEDURE — 97110 THERAPEUTIC EXERCISES: CPT | Mod: GP | Performed by: PHYSICAL THERAPIST

## 2022-03-21 PROCEDURE — 97535 SELF CARE MNGMENT TRAINING: CPT | Mod: GP | Performed by: PHYSICAL THERAPIST

## 2022-03-21 NOTE — DISCHARGE INSTRUCTIONS
"Try out an elbow sleeve that gives some limitation to elbow being able to go \"too straight\"    Can do elbow bend/extension, rotate forearm palm up and palm down, bend wrist back and forth and bend fingers in and out just as far as feels good x5-10 reps for movement and bloodflow to help elbow sensitivity calm down 2-3x/day    Attempt 1 mile out and back and then work up to your 2 mile route - try without orthotics until weaning in on orthotics during work day    "

## 2022-04-20 DIAGNOSIS — J45.40 MODERATE PERSISTENT ASTHMA WITHOUT COMPLICATION: ICD-10-CM

## 2022-04-23 RX ORDER — ALBUTEROL SULFATE 90 UG/1
AEROSOL, METERED RESPIRATORY (INHALATION)
Qty: 18 G | Refills: 3 | Status: SHIPPED | OUTPATIENT
Start: 2022-04-23 | End: 2022-09-07

## 2022-09-11 ENCOUNTER — HEALTH MAINTENANCE LETTER (OUTPATIENT)
Age: 52
End: 2022-09-11

## 2022-12-14 NOTE — TELEPHONE ENCOUNTER
Central PA team  [phone]  Pool: HE PA MED ([id number])          PA has been initiated.       PA form completed and faxed insurance via Cover My Meds     Key:  [id number]     Medication:  SYMBICORT 160-4.5 MCG/ACT    Insurance:  [employer]ED[employer]        Response will be received via fax and may take up to 5-10 business days depending on plan         Methotrexate Pregnancy And Lactation Text: This medication is Pregnancy Category X and is known to cause fetal harm. This medication is excreted in breast milk.

## 2023-01-17 NOTE — PROGRESS NOTES
[facility]    Outpatient Physical Therapy Discharge Note  Patient: Annabella Guillen  : 1970    Beginning/End Dates of Reporting Period:  22 to 3/21/22     Referring Provider: Dr. Danica Liang    Plan:  Changes to therapy plan of care: Pt attended 4 visits in PT and was making slight progress towards goals but then did not return x60 days. See note below for last known status. Will discharge this episode of PT. Thank you for this referral!       22 1600   Signing Clinician's Name / Credentials   Signing clinician's name / credentials Johana Bourne PT, DPT, OCS, CLT   Session Number   Session Number    Progress Note/Recertification   Progress Note Due Date 22   Adult Goals   PT Ortho Eval Goals 1;2;3   Ortho Goal 1   Goal Description Pt will be able to sleep in bed without increase in back pain waking pt up in 12 weeks for improved QOL.   Goal Progress IMPROVING - not a great night last night but this has been somewhat better - pt also did get to sleep on her right side for a little bit with less sensitivity on her R hip   Target Date 22   Ortho Goal 2   Goal Description Pt will be able to walk in/out pt's homes without back pain or difficulty in 12 weeks for improved ability to perform job.    Goal Progress IMPROVING - not having to do as much visits recently   Target Date 22   Ortho Goal 3   Goal Description Pt will be able to perform ADLs and functional mobility without needing OTC pain medication with minimal low back pain for improved QOL in 12 weeks.   Goal Progress IMPROVING - pt has been having some elbow pain still    Target Date 22   Subjective Report   Subjective Report Pt reports forgetting to  her orthotics so she has to reschedule to go pick those up - hoping to do this by the end of the week. Pt reports the full back spasming had been better  "but she had some of it last night and feels more stiff today. Pt's left elbow has been \"zinging\" at night even though she stopped taking off her bra that way 3 weeks ago. Pt's right leg pain is improving - instead of pain from buttock to foot she is only having some sensitivity from outside hip to knee. Pain rating bothered in upper back after charting 30 charts yesterday 3/10, elbow pain when it happens it is very sharp but don't last long. Pt reports doing okay so far on Lexapro - not feeling as emotional as before but also not feeling \"flat\" as she has had on meds in the past. Pt will start doing a couple of months of food from an old program that she did well with.    Objective Measures   Objective Measures Objective Measure 1;Objective Measure 2;Objective Measure 3;Objective Measure 4   Objective Measure 1   Objective Measure Strength - from last visit   Details R hip flexor 4+/5 with pain (was 4/5), L hip flexor 5/5 without pain, B quad and DF 5/5 without pain, R hip abd 3+/5 with mod pain (was 2/5 with significant pain, L hip abd 4+/5 with min difficulty   Objective Measure 2   Objective Measure ROM - from last visit   Details R hip ROM IR and abd min limited and painful end range, flex and ER WNL , L hip ROM WNL without R sided pain   Objective Measure 3   Objective Measure Special testing - from last visit   Details R SLR positive ~50 degrees and increases with add and IR, L SLR up to 75 degrees with min increase in buttock tension   Objective Measure 4   Objective Measure Elbow screen   Details Elbow flex and ext WNL without pain, supination painful, pronation okay, finger extension painful   Treatment Interventions   Interventions Therapeutic Procedure/Exercise;Self Care/Home Management;Neuromuscular Re-education   Therapeutic Procedure/exercise   Therapeutic Procedures: strength, endurance, ROM, flexibillity minutes (71858) 40   Skilled Intervention Reassessed response to HEP and activity level since " last visit. Screened Left elbow and advised pt on ROM HEP for movement and blood flow to help with elbow sensitivity. Discussed walking program pt is interested in getting back into - will start attempting 1 mile first and then try getting back to her 2 mile loop. Pt is getting orthotics soon and advised attempting walking without orthotics until she has built up wearing them for full work day.   Treatment Detail Performed L elbow flex/ext, pronation/supination, wrist flex/ext and finger flex/ext   Self Care/home Management   ADL/Home Mgmt Training (25318) 8   Skilled Intervention Educated pt on the potential benefit of try an arm sleeve on left arm to help with arm sensitivity and positioning while she sleeps   Education   Learner Patient   Readiness Eager   Method Booklet/handout   Response Verbalizes Understanding   Plan   Home program TA and glut iso, pelvic clock, LTR, sciatic nerve glide and hooklying hip abd, clamshell, SLR and bridge ADDED elbow flex/ext, sup/pron, wrist flex/ext and finger flex/ext and walking program   Plan for next session Reasssess response to HEP and activity level. Progress ROM and stability training as able. Reassess L elbow.   Comments   Comments Pt niles's continued decreased sensitivity and limitations with R sided lumbar facet/hip and lumbopelvic mobility and strength deficits and is great candidate for skilled PT services to decrease impairments and reach goals. Today pt niles's L elbow common extensor tendon and supinator dysfunction as well.   Total Session Time   Timed Code Treatment Minutes 48   Total Treatment Time (sum of timed and untimed services) 48   Medicare Claim Information   Medical Diagnosis Chronic bilateral low back pain without sciatica M54.50, G89.29    PT Diagnosis Chronic low back pain with R leg pain with decreased trunk ROM and trunk/core strength

## 2023-01-17 NOTE — ADDENDUM NOTE
Encounter addended by: Johana Bourne, PT on: 1/17/2023 11:56 AM   Actions taken: Clinical Note Signed, Flowsheet accepted, Episode resolved

## 2023-01-23 ENCOUNTER — HEALTH MAINTENANCE LETTER (OUTPATIENT)
Age: 53
End: 2023-01-23

## 2023-03-20 ASSESSMENT — ASTHMA QUESTIONNAIRES
QUESTION_4 LAST FOUR WEEKS HOW OFTEN HAVE YOU USED YOUR RESCUE INHALER OR NEBULIZER MEDICATION (SUCH AS ALBUTEROL): ONCE A WEEK OR LESS
QUESTION_1 LAST FOUR WEEKS HOW MUCH OF THE TIME DID YOUR ASTHMA KEEP YOU FROM GETTING AS MUCH DONE AT WORK, SCHOOL OR AT HOME: NONE OF THE TIME
ACT_TOTALSCORE: 22
QUESTION_5 LAST FOUR WEEKS HOW WOULD YOU RATE YOUR ASTHMA CONTROL: WELL CONTROLLED
QUESTION_2 LAST FOUR WEEKS HOW OFTEN HAVE YOU HAD SHORTNESS OF BREATH: ONCE OR TWICE A WEEK
QUESTION_3 LAST FOUR WEEKS HOW OFTEN DID YOUR ASTHMA SYMPTOMS (WHEEZING, COUGHING, SHORTNESS OF BREATH, CHEST TIGHTNESS OR PAIN) WAKE YOU UP AT NIGHT OR EARLIER THAN USUAL IN THE MORNING: NOT AT ALL
ACT_TOTALSCORE: 22

## 2023-03-21 ENCOUNTER — VIRTUAL VISIT (OUTPATIENT)
Dept: FAMILY MEDICINE | Facility: CLINIC | Age: 53
End: 2023-03-21
Payer: COMMERCIAL

## 2023-03-21 DIAGNOSIS — G89.29 CHRONIC BILATERAL LOW BACK PAIN, UNSPECIFIED WHETHER SCIATICA PRESENT: ICD-10-CM

## 2023-03-21 DIAGNOSIS — M54.50 CHRONIC BILATERAL LOW BACK PAIN, UNSPECIFIED WHETHER SCIATICA PRESENT: ICD-10-CM

## 2023-03-21 DIAGNOSIS — R10.2 PELVIC PAIN IN FEMALE: Primary | ICD-10-CM

## 2023-03-21 PROCEDURE — 99214 OFFICE O/P EST MOD 30 MIN: CPT | Mod: VID | Performed by: NURSE PRACTITIONER

## 2023-03-21 NOTE — PROGRESS NOTES
"Annabella is a 52 year old who is being evaluated via a billable video visit.      How would you like to obtain your AVS? MyChart  If the video visit is dropped, the invitation should be resent by: Text to cell phone: [phone]  Will anyone else be joining your video visit? No          Assessment & Plan     Pelvic pain in female  - US Pelvic Complete with Transvaginal; Future    Chronic bilateral low back pain, unspecified whether sciatica present  - MR Lumbar Spine w/o Contrast; Future    Some of her symptoms are concerning for saddle anesthesia, especially new urinary leakage and lack of feeling when having bowel movements.  No fecal incontinence, however she has had a longstanding history of back pain issues with significant weight gain in a short period of time.  Could also be concerning for uterine fibroid or ovarian cyst.  She does have a positive family history of ovarian and endometrial cancer.  At this time I think it be reasonable to obtain both a lumbar MRI and pelvic ultrasound.  Will place appropriate referrals pending results..      Ordering of each unique test  30 minutes spent on the date of the encounter doing chart review, history and exam, documentation and further activities per the note           No follow-ups on file.    VALERI Buck [facility]    Joel Winchester is a 52 year old, presenting for the following health issues:  Abdominal Pain (Abdominal Pain since Dec 2021)      History of Present Illness       Reason for visit:  Continued Abdominal pressure and now feels firm. Resulting in \"luigi horse\" feeling in lower abdomen and Lower back.    She eats 2-3 servings of fruits and vegetables daily.She consumes 1 sweetened beverage(s) daily.She exercises with enough effort to increase her heart rate 9 or less minutes per day.  She exercises with enough effort to increase her heart rate 3 or less days per week.   She is taking medications " "regularly.       Lower back pain.  Did PT for 10 weeks.  Partially helped.  This was in 2021.    Feels like there is pulling forward, or could be uterus.      Gained 70lbs in 2021, went to abdomen.  Stopped having cycles.      Had a CT in 2021.      Almost feels like something is falling out.  Has a sister with ovarian cancer and one with uterine cancer.    Recently diagnosed with diabetes.  This is very stressful.  Pain if inside body, pulling forward.    Has lost 28lbs.  Feels like lower abdomen is firm/tense.  History one vaginal birth.        Sexual discomfort, not internally.  Just something going on.    No periods since september of 2022.  No other symptoms.  Doesn't have a lot of family info to compare to because females have had hysterctomies.    Feels in her stomach like she needs to go to the bathroom and will sit down and it \"just falls out\".  Not having a lot of feeling when it happens.  Has maybe had a little urinary leaking.      Review of Systems   Constitutional, HEENT, cardiovascular, pulmonary, gi and gu systems are negative, except as otherwise noted.      Objective           Vitals:  No vitals were obtained today due to virtual visit.    Physical Exam   GENERAL: Healthy, alert and no distress  EYES: Eyes grossly normal to inspection.  No discharge or erythema, or obvious scleral/conjunctival abnormalities.  RESP: No audible wheeze, cough, or visible cyanosis.  No visible retractions or increased work of breathing.    SKIN: Visible skin clear. No significant rash, abnormal pigmentation or lesions.  NEURO: Cranial nerves grossly intact.  Mentation and speech appropriate for age.  PSYCH: Mentation appears normal, affect normal/bright, judgement and insight intact, normal speech and appearance well-groomed.                Video-Visit Details    Type of service:  Video Visit     Originating Location (pt. Location): Home    Distant Location (provider location):  Off-site  Platform used for Video Visit: " AmWell

## 2023-04-07 ENCOUNTER — HOSPITAL ENCOUNTER (OUTPATIENT)
Dept: ULTRASOUND IMAGING | Facility: CLINIC | Age: 53
Discharge: HOME | End: 2023-04-07
Attending: NURSE PRACTITIONER
Payer: COMMERCIAL

## 2023-04-07 DIAGNOSIS — R10.2 PELVIC PAIN IN FEMALE: ICD-10-CM

## 2023-04-07 PROCEDURE — 76856 US EXAM PELVIC COMPLETE: CPT

## 2023-04-18 DIAGNOSIS — F40.240 CLAUSTROPHOBIA: Primary | ICD-10-CM

## 2023-04-18 RX ORDER — LORAZEPAM 2 MG/1
2 TABLET ORAL ONCE
Qty: 1 TABLET | Refills: 0 | Status: SHIPPED | OUTPATIENT
Start: 2023-04-18 | End: 2023-04-18

## 2023-06-30 ENCOUNTER — TRANSFERRED RECORDS (OUTPATIENT)
Dept: HEALTH INFORMATION MANAGEMENT | Facility: CLINIC | Age: 53
End: 2023-06-30
Payer: COMMERCIAL

## 2023-06-30 LAB — RETINOPATHY: NEGATIVE

## 2024-01-23 NOTE — TELEPHONE ENCOUNTER
No PA needed, medication is covered. Called [facility] listed below. They were not asking for a PA request. Patient picked up brand Symbicort yesterday and it processed through insurance successfully.     No further action needed       
Prior Authorization Request  Who s requesting:  Pharmacy  Pharmacy Name and Location: [id number] [place] [address] [address] AT [facility] [address]  Medication Name:   budesonide-formoteroL (SYMBICORT) 160-4.5 mcg/actuation inhaler 1 Inhaler 12 12/4/2020     Sig - Route: Inhale 2 puffs 2 (two) times a day. - Inhalation    Sent to pharmacy as: budesonide-formoteroL  mcg-4.5 mcg/actuation aerosol inhaler (SYMBICORT)    E-Prescribing Status: Receipt confirmed by pharmacy (12/4/2020  1:28 PM         Insurance Plan: [facility] [employer] [facility]  Insurance Member ID Number:  [id number]  CoverMyMeds Key: N/A  Informed patient that prior authorizations can take up to 10 business days for response:   No  Okay to leave a detailed message: Yes        
patient

## 2024-02-23 ENCOUNTER — HOSPITAL ENCOUNTER (OUTPATIENT)
Dept: MRI IMAGING | Facility: HOSPITAL | Age: 54
Discharge: HOME | End: 2024-02-23
Attending: NURSE PRACTITIONER
Payer: COMMERCIAL

## 2024-02-23 DIAGNOSIS — M54.50 CHRONIC BILATERAL LOW BACK PAIN, UNSPECIFIED WHETHER SCIATICA PRESENT: ICD-10-CM

## 2024-02-23 DIAGNOSIS — G89.29 CHRONIC BILATERAL LOW BACK PAIN, UNSPECIFIED WHETHER SCIATICA PRESENT: ICD-10-CM

## 2024-02-23 PROCEDURE — 72148 MRI LUMBAR SPINE W/O DYE: CPT

## 2024-02-24 ENCOUNTER — HEALTH MAINTENANCE LETTER (OUTPATIENT)
Age: 54
End: 2024-02-24

## 2024-07-22 ENCOUNTER — HOSPITAL ENCOUNTER (OUTPATIENT)
Dept: MAMMOGRAPHY | Facility: CLINIC | Age: 54
Discharge: HOME | End: 2024-07-22
Admitting: FAMILY MEDICINE
Payer: COMMERCIAL

## 2024-07-22 DIAGNOSIS — Z12.31 VISIT FOR SCREENING MAMMOGRAM: ICD-10-CM

## 2024-07-22 PROCEDURE — 77063 BREAST TOMOSYNTHESIS BI: CPT

## 2025-03-09 ENCOUNTER — HEALTH MAINTENANCE LETTER (OUTPATIENT)
Age: 55
End: 2025-03-09